# Patient Record
Sex: FEMALE | Race: BLACK OR AFRICAN AMERICAN | Employment: FULL TIME | URBAN - METROPOLITAN AREA
[De-identification: names, ages, dates, MRNs, and addresses within clinical notes are randomized per-mention and may not be internally consistent; named-entity substitution may affect disease eponyms.]

---

## 2017-02-10 ENCOUNTER — ANESTHESIA (OUTPATIENT)
Dept: OPERATING ROOM | Age: 62
End: 2017-02-10
Payer: COMMERCIAL

## 2017-02-10 ENCOUNTER — ANESTHESIA EVENT (OUTPATIENT)
Dept: OPERATING ROOM | Age: 62
End: 2017-02-10
Payer: COMMERCIAL

## 2017-02-10 ENCOUNTER — HOSPITAL ENCOUNTER (OUTPATIENT)
Age: 62
Setting detail: OUTPATIENT SURGERY
Discharge: HOME | End: 2017-02-10
Attending: OTOLARYNGOLOGY | Admitting: OTOLARYNGOLOGY
Payer: COMMERCIAL

## 2017-02-10 VITALS
TEMPERATURE: 96.8 F | SYSTOLIC BLOOD PRESSURE: 115 MMHG | BODY MASS INDEX: 30.36 KG/M2 | DIASTOLIC BLOOD PRESSURE: 86 MMHG | HEIGHT: 62 IN | WEIGHT: 165 LBS | OXYGEN SATURATION: 98 % | RESPIRATION RATE: 14 BRPM | HEART RATE: 97 BPM

## 2017-02-10 VITALS — SYSTOLIC BLOOD PRESSURE: 159 MMHG | TEMPERATURE: 94.9 F | DIASTOLIC BLOOD PRESSURE: 120 MMHG | OXYGEN SATURATION: 100 %

## 2017-02-10 LAB
GLUCOSE BLD-MCNC: 92 MG/DL (ref 74–106)
POC POTASSIUM: 3.4 MMOL/L (ref 3.5–5.1)

## 2017-02-10 PROCEDURE — 84132 ASSAY OF SERUM POTASSIUM: CPT

## 2017-02-10 PROCEDURE — 2580000003 HC RX 258: Performed by: ANESTHESIOLOGY

## 2017-02-10 PROCEDURE — 7100000000 HC PACU RECOVERY - FIRST 15 MIN: Performed by: OTOLARYNGOLOGY

## 2017-02-10 PROCEDURE — 2780000010 HC IMPLANT OTHER: Performed by: OTOLARYNGOLOGY

## 2017-02-10 PROCEDURE — 2500000003 HC RX 250 WO HCPCS: Performed by: OTOLARYNGOLOGY

## 2017-02-10 PROCEDURE — 3700000000 HC ANESTHESIA ATTENDED CARE: Performed by: OTOLARYNGOLOGY

## 2017-02-10 PROCEDURE — 7100000011 HC PHASE II RECOVERY - ADDTL 15 MIN: Performed by: OTOLARYNGOLOGY

## 2017-02-10 PROCEDURE — 88305 TISSUE EXAM BY PATHOLOGIST: CPT

## 2017-02-10 PROCEDURE — 7100000001 HC PACU RECOVERY - ADDTL 15 MIN: Performed by: OTOLARYNGOLOGY

## 2017-02-10 PROCEDURE — 3600000004 HC SURGERY LEVEL 4 BASE: Performed by: OTOLARYNGOLOGY

## 2017-02-10 PROCEDURE — 7100000010 HC PHASE II RECOVERY - FIRST 15 MIN: Performed by: OTOLARYNGOLOGY

## 2017-02-10 PROCEDURE — 2500000003 HC RX 250 WO HCPCS: Performed by: ANESTHESIOLOGY

## 2017-02-10 PROCEDURE — 6360000002 HC RX W HCPCS

## 2017-02-10 PROCEDURE — 3700000001 HC ADD 15 MINUTES (ANESTHESIA): Performed by: OTOLARYNGOLOGY

## 2017-02-10 PROCEDURE — 2580000003 HC RX 258: Performed by: OTOLARYNGOLOGY

## 2017-02-10 PROCEDURE — 3600000014 HC SURGERY LEVEL 4 ADDTL 15MIN: Performed by: OTOLARYNGOLOGY

## 2017-02-10 PROCEDURE — 6370000000 HC RX 637 (ALT 250 FOR IP): Performed by: OTOLARYNGOLOGY

## 2017-02-10 PROCEDURE — 6360000002 HC RX W HCPCS: Performed by: NURSE ANESTHETIST, CERTIFIED REGISTERED

## 2017-02-10 PROCEDURE — 2500000003 HC RX 250 WO HCPCS

## 2017-02-10 PROCEDURE — 2500000003 HC RX 250 WO HCPCS: Performed by: NURSE ANESTHETIST, CERTIFIED REGISTERED

## 2017-02-10 PROCEDURE — 6360000002 HC RX W HCPCS: Performed by: ANESTHESIOLOGY

## 2017-02-10 PROCEDURE — 6370000000 HC RX 637 (ALT 250 FOR IP): Performed by: ANESTHESIOLOGY

## 2017-02-10 PROCEDURE — 82947 ASSAY GLUCOSE BLOOD QUANT: CPT

## 2017-02-10 PROCEDURE — 6360000002 HC RX W HCPCS: Performed by: OTOLARYNGOLOGY

## 2017-02-10 DEVICE — VENT TUBE 1016040 5PK MOD GOODE T SIL
Type: IMPLANTABLE DEVICE | Status: FUNCTIONAL
Brand: GOODE T-TUBE®

## 2017-02-10 RX ORDER — OXYMETAZOLINE HYDROCHLORIDE 0.05 G/100ML
2 SPRAY NASAL EVERY 10 MIN PRN
Status: COMPLETED | OUTPATIENT
Start: 2017-02-10 | End: 2017-02-10

## 2017-02-10 RX ORDER — FENTANYL CITRATE 50 UG/ML
50 INJECTION, SOLUTION INTRAMUSCULAR; INTRAVENOUS EVERY 5 MIN PRN
Status: DISCONTINUED | OUTPATIENT
Start: 2017-02-10 | End: 2017-02-10 | Stop reason: HOSPADM

## 2017-02-10 RX ORDER — OXYCODONE HYDROCHLORIDE AND ACETAMINOPHEN 5; 325 MG/1; MG/1
1 TABLET ORAL ONCE
Status: DISCONTINUED | OUTPATIENT
Start: 2017-02-10 | End: 2017-02-10 | Stop reason: HOSPADM

## 2017-02-10 RX ORDER — DEXAMETHASONE SODIUM PHOSPHATE 10 MG/ML
INJECTION INTRAMUSCULAR; INTRAVENOUS PRN
Status: DISCONTINUED | OUTPATIENT
Start: 2017-02-10 | End: 2017-02-10 | Stop reason: SDUPTHER

## 2017-02-10 RX ORDER — OXYMETAZOLINE HYDROCHLORIDE 0.05 G/100ML
SPRAY NASAL PRN
Status: DISCONTINUED | OUTPATIENT
Start: 2017-02-10 | End: 2017-02-10 | Stop reason: HOSPADM

## 2017-02-10 RX ORDER — MIDAZOLAM HYDROCHLORIDE 1 MG/ML
INJECTION INTRAMUSCULAR; INTRAVENOUS PRN
Status: DISCONTINUED | OUTPATIENT
Start: 2017-02-10 | End: 2017-02-10 | Stop reason: SDUPTHER

## 2017-02-10 RX ORDER — HYDRALAZINE HYDROCHLORIDE 20 MG/ML
5 INJECTION INTRAMUSCULAR; INTRAVENOUS EVERY 10 MIN PRN
Status: DISCONTINUED | OUTPATIENT
Start: 2017-02-10 | End: 2017-02-10 | Stop reason: HOSPADM

## 2017-02-10 RX ORDER — ONDANSETRON 2 MG/ML
4 INJECTION INTRAMUSCULAR; INTRAVENOUS
Status: COMPLETED | OUTPATIENT
Start: 2017-02-10 | End: 2017-02-10

## 2017-02-10 RX ORDER — LIDOCAINE HYDROCHLORIDE 10 MG/ML
0.4 INJECTION, SOLUTION EPIDURAL; INFILTRATION; INTRACAUDAL; PERINEURAL ONCE
Status: COMPLETED | OUTPATIENT
Start: 2017-02-10 | End: 2017-02-10

## 2017-02-10 RX ORDER — OXYCODONE HYDROCHLORIDE AND ACETAMINOPHEN 5; 325 MG/1; MG/1
2 TABLET ORAL ONCE
Status: DISCONTINUED | OUTPATIENT
Start: 2017-02-10 | End: 2017-02-10 | Stop reason: HOSPADM

## 2017-02-10 RX ORDER — SCOLOPAMINE TRANSDERMAL SYSTEM 1 MG/1
1 PATCH, EXTENDED RELEASE TRANSDERMAL ONCE
Status: DISCONTINUED | OUTPATIENT
Start: 2017-02-10 | End: 2017-02-10 | Stop reason: HOSPADM

## 2017-02-10 RX ORDER — ONDANSETRON 2 MG/ML
INJECTION INTRAMUSCULAR; INTRAVENOUS PRN
Status: DISCONTINUED | OUTPATIENT
Start: 2017-02-10 | End: 2017-02-10 | Stop reason: SDUPTHER

## 2017-02-10 RX ORDER — TRIAMCINOLONE ACETONIDE 40 MG/ML
INJECTION, SUSPENSION INTRA-ARTICULAR; INTRAMUSCULAR PRN
Status: DISCONTINUED | OUTPATIENT
Start: 2017-02-10 | End: 2017-02-10 | Stop reason: HOSPADM

## 2017-02-10 RX ORDER — GLYCOPYRROLATE 0.2 MG/ML
INJECTION INTRAMUSCULAR; INTRAVENOUS PRN
Status: DISCONTINUED | OUTPATIENT
Start: 2017-02-10 | End: 2017-02-10 | Stop reason: SDUPTHER

## 2017-02-10 RX ORDER — FENTANYL CITRATE 50 UG/ML
25 INJECTION, SOLUTION INTRAMUSCULAR; INTRAVENOUS EVERY 5 MIN PRN
Status: DISCONTINUED | OUTPATIENT
Start: 2017-02-10 | End: 2017-02-10 | Stop reason: HOSPADM

## 2017-02-10 RX ORDER — LIDOCAINE HYDROCHLORIDE 10 MG/ML
INJECTION, SOLUTION EPIDURAL; INFILTRATION; INTRACAUDAL; PERINEURAL PRN
Status: DISCONTINUED | OUTPATIENT
Start: 2017-02-10 | End: 2017-02-10 | Stop reason: SDUPTHER

## 2017-02-10 RX ORDER — LIDOCAINE HYDROCHLORIDE AND EPINEPHRINE 10; 10 MG/ML; UG/ML
INJECTION, SOLUTION INFILTRATION; PERINEURAL PRN
Status: DISCONTINUED | OUTPATIENT
Start: 2017-02-10 | End: 2017-02-10 | Stop reason: HOSPADM

## 2017-02-10 RX ORDER — MEPERIDINE HYDROCHLORIDE 50 MG/ML
12.5 INJECTION INTRAMUSCULAR; INTRAVENOUS; SUBCUTANEOUS EVERY 5 MIN PRN
Status: DISCONTINUED | OUTPATIENT
Start: 2017-02-10 | End: 2017-02-10 | Stop reason: HOSPADM

## 2017-02-10 RX ORDER — PROPOFOL 10 MG/ML
INJECTION, EMULSION INTRAVENOUS PRN
Status: DISCONTINUED | OUTPATIENT
Start: 2017-02-10 | End: 2017-02-10 | Stop reason: SDUPTHER

## 2017-02-10 RX ORDER — PROMETHAZINE HYDROCHLORIDE 25 MG/ML
6.25 INJECTION, SOLUTION INTRAMUSCULAR; INTRAVENOUS
Status: COMPLETED | OUTPATIENT
Start: 2017-02-10 | End: 2017-02-10

## 2017-02-10 RX ORDER — DEXTROSE MONOHYDRATE 50 MG/ML
INJECTION, SOLUTION INTRAVENOUS CONTINUOUS
Status: DISCONTINUED | OUTPATIENT
Start: 2017-02-10 | End: 2017-02-10 | Stop reason: HOSPADM

## 2017-02-10 RX ORDER — SODIUM CHLORIDE, SODIUM LACTATE, POTASSIUM CHLORIDE, CALCIUM CHLORIDE 600; 310; 30; 20 MG/100ML; MG/100ML; MG/100ML; MG/100ML
1000 INJECTION, SOLUTION INTRAVENOUS CONTINUOUS
Status: DISCONTINUED | OUTPATIENT
Start: 2017-02-10 | End: 2017-02-10 | Stop reason: HOSPADM

## 2017-02-10 RX ORDER — ECHINACEA PURPUREA EXTRACT 125 MG
1 TABLET ORAL PRN
Status: DISCONTINUED | OUTPATIENT
Start: 2017-02-10 | End: 2017-02-10 | Stop reason: HOSPADM

## 2017-02-10 RX ORDER — NEOSTIGMINE METHYLSULFATE 1 MG/ML
INJECTION, SOLUTION INTRAVENOUS PRN
Status: DISCONTINUED | OUTPATIENT
Start: 2017-02-10 | End: 2017-02-10 | Stop reason: SDUPTHER

## 2017-02-10 RX ORDER — FENTANYL CITRATE 50 UG/ML
INJECTION, SOLUTION INTRAMUSCULAR; INTRAVENOUS PRN
Status: DISCONTINUED | OUTPATIENT
Start: 2017-02-10 | End: 2017-02-10 | Stop reason: SDUPTHER

## 2017-02-10 RX ORDER — DIPHENHYDRAMINE HYDROCHLORIDE 50 MG/ML
12.5 INJECTION INTRAMUSCULAR; INTRAVENOUS
Status: DISCONTINUED | OUTPATIENT
Start: 2017-02-10 | End: 2017-02-10 | Stop reason: HOSPADM

## 2017-02-10 RX ORDER — MIDAZOLAM HYDROCHLORIDE 1 MG/ML
1 INJECTION INTRAMUSCULAR; INTRAVENOUS EVERY 5 MIN PRN
Status: COMPLETED | OUTPATIENT
Start: 2017-02-10 | End: 2017-02-10

## 2017-02-10 RX ORDER — OXYCODONE HYDROCHLORIDE AND ACETAMINOPHEN 5; 325 MG/1; MG/1
1 TABLET ORAL EVERY 6 HOURS PRN
Qty: 30 TABLET | Refills: 0 | Status: SHIPPED | OUTPATIENT
Start: 2017-02-10 | End: 2017-02-17

## 2017-02-10 RX ORDER — ROCURONIUM BROMIDE 10 MG/ML
INJECTION, SOLUTION INTRAVENOUS PRN
Status: DISCONTINUED | OUTPATIENT
Start: 2017-02-10 | End: 2017-02-10 | Stop reason: SDUPTHER

## 2017-02-10 RX ORDER — LABETALOL HYDROCHLORIDE 5 MG/ML
5 INJECTION, SOLUTION INTRAVENOUS EVERY 10 MIN PRN
Status: DISCONTINUED | OUTPATIENT
Start: 2017-02-10 | End: 2017-02-10 | Stop reason: HOSPADM

## 2017-02-10 RX ORDER — AMOXICILLIN AND CLAVULANATE POTASSIUM 875; 125 MG/1; MG/1
1 TABLET, FILM COATED ORAL 2 TIMES DAILY
Qty: 20 TABLET | Refills: 0 | Status: SHIPPED | OUTPATIENT
Start: 2017-02-10 | End: 2017-02-20

## 2017-02-10 RX ORDER — MAGNESIUM HYDROXIDE 1200 MG/15ML
LIQUID ORAL CONTINUOUS PRN
Status: DISCONTINUED | OUTPATIENT
Start: 2017-02-10 | End: 2017-02-10 | Stop reason: HOSPADM

## 2017-02-10 RX ORDER — WATER 1000 ML/1000ML
INJECTION, SOLUTION INTRAVENOUS PRN
Status: DISCONTINUED | OUTPATIENT
Start: 2017-02-10 | End: 2017-02-10 | Stop reason: HOSPADM

## 2017-02-10 RX ADMIN — LIDOCAINE HYDROCHLORIDE 0.4 ML: 10 INJECTION, SOLUTION INFILTRATION; PERINEURAL at 08:10

## 2017-02-10 RX ADMIN — MIDAZOLAM HYDROCHLORIDE 1 MG: 1 INJECTION, SOLUTION INTRAMUSCULAR; INTRAVENOUS at 09:15

## 2017-02-10 RX ADMIN — PROPOFOL 200 MG: 10 INJECTION, EMULSION INTRAVENOUS at 09:16

## 2017-02-10 RX ADMIN — HYDROMORPHONE HYDROCHLORIDE 0.5 MG: 1 INJECTION, SOLUTION INTRAMUSCULAR; INTRAVENOUS; SUBCUTANEOUS at 14:40

## 2017-02-10 RX ADMIN — HYDRALAZINE HYDROCHLORIDE 5 MG: 20 INJECTION INTRAMUSCULAR; INTRAVENOUS at 12:13

## 2017-02-10 RX ADMIN — OXYMETAZOLINE HYDROCHLORIDE 2 SPRAY: 5 SPRAY NASAL at 08:22

## 2017-02-10 RX ADMIN — OXYMETAZOLINE HYDROCHLORIDE 2 SPRAY: 5 SPRAY NASAL at 08:12

## 2017-02-10 RX ADMIN — HYDRALAZINE HYDROCHLORIDE 5 MG: 20 INJECTION INTRAMUSCULAR; INTRAVENOUS at 12:00

## 2017-02-10 RX ADMIN — PHENYLEPHRINE HYDROCHLORIDE 100 MCG: 10 INJECTION INTRAMUSCULAR; INTRAVENOUS; SUBCUTANEOUS at 09:41

## 2017-02-10 RX ADMIN — FENTANYL CITRATE 100 MCG: 50 INJECTION, SOLUTION INTRAMUSCULAR; INTRAVENOUS at 09:16

## 2017-02-10 RX ADMIN — NEOSTIGMINE METHYLSULFATE 3 MG: 1 INJECTION INTRAVENOUS at 10:52

## 2017-02-10 RX ADMIN — SODIUM CHLORIDE, POTASSIUM CHLORIDE, SODIUM LACTATE AND CALCIUM CHLORIDE: 600; 310; 30; 20 INJECTION, SOLUTION INTRAVENOUS at 10:14

## 2017-02-10 RX ADMIN — FENTANYL CITRATE 50 MCG: 50 INJECTION, SOLUTION INTRAMUSCULAR; INTRAVENOUS at 10:39

## 2017-02-10 RX ADMIN — SODIUM CHLORIDE, POTASSIUM CHLORIDE, SODIUM LACTATE AND CALCIUM CHLORIDE 1000 ML: 600; 310; 30; 20 INJECTION, SOLUTION INTRAVENOUS at 08:10

## 2017-02-10 RX ADMIN — FENTANYL CITRATE 50 MCG: 50 INJECTION, SOLUTION INTRAMUSCULAR; INTRAVENOUS at 12:20

## 2017-02-10 RX ADMIN — SODIUM CHLORIDE, POTASSIUM CHLORIDE, SODIUM LACTATE AND CALCIUM CHLORIDE 1000 ML: 600; 310; 30; 20 INJECTION, SOLUTION INTRAVENOUS at 12:20

## 2017-02-10 RX ADMIN — DEXAMETHASONE SODIUM PHOSPHATE 10 MG: 10 INJECTION INTRAMUSCULAR; INTRAVENOUS at 09:25

## 2017-02-10 RX ADMIN — GLYCOPYRROLATE 0.4 MG: 0.2 INJECTION INTRAMUSCULAR; INTRAVENOUS at 10:52

## 2017-02-10 RX ADMIN — MIDAZOLAM HYDROCHLORIDE 1 MG: 1 INJECTION, SOLUTION INTRAMUSCULAR; INTRAVENOUS at 08:30

## 2017-02-10 RX ADMIN — ROCURONIUM BROMIDE 40 MG: 10 INJECTION, SOLUTION INTRAVENOUS at 09:16

## 2017-02-10 RX ADMIN — PROMETHAZINE HYDROCHLORIDE 6.25 MG: 25 INJECTION, SOLUTION INTRAMUSCULAR; INTRAVENOUS at 11:20

## 2017-02-10 RX ADMIN — ONDANSETRON 4 MG: 2 INJECTION, SOLUTION INTRAMUSCULAR; INTRAVENOUS at 10:11

## 2017-02-10 RX ADMIN — MIDAZOLAM HYDROCHLORIDE 1 MG: 1 INJECTION, SOLUTION INTRAMUSCULAR; INTRAVENOUS at 08:22

## 2017-02-10 RX ADMIN — FENTANYL CITRATE 50 MCG: 50 INJECTION, SOLUTION INTRAMUSCULAR; INTRAVENOUS at 11:20

## 2017-02-10 RX ADMIN — ONDANSETRON 4 MG: 2 INJECTION, SOLUTION INTRAMUSCULAR; INTRAVENOUS at 11:15

## 2017-02-10 RX ADMIN — PHENYLEPHRINE HYDROCHLORIDE 100 MCG: 10 INJECTION INTRAMUSCULAR; INTRAVENOUS; SUBCUTANEOUS at 09:56

## 2017-02-10 RX ADMIN — LIDOCAINE HYDROCHLORIDE 50 MG: 10 INJECTION, SOLUTION EPIDURAL; INFILTRATION; INTRACAUDAL; PERINEURAL at 09:16

## 2017-02-10 RX ADMIN — PHENYLEPHRINE HYDROCHLORIDE 100 MCG: 10 INJECTION INTRAMUSCULAR; INTRAVENOUS; SUBCUTANEOUS at 10:07

## 2017-02-10 RX ADMIN — HYDROMORPHONE HYDROCHLORIDE 0.5 MG: 1 INJECTION, SOLUTION INTRAMUSCULAR; INTRAVENOUS; SUBCUTANEOUS at 11:25

## 2017-02-10 RX ADMIN — HYDROMORPHONE HYDROCHLORIDE 0.5 MG: 1 INJECTION, SOLUTION INTRAMUSCULAR; INTRAVENOUS; SUBCUTANEOUS at 14:45

## 2017-02-10 RX ADMIN — MIDAZOLAM HYDROCHLORIDE 1 MG: 1 INJECTION, SOLUTION INTRAMUSCULAR; INTRAVENOUS at 09:16

## 2017-02-10 RX ADMIN — FENTANYL CITRATE 50 MCG: 50 INJECTION, SOLUTION INTRAMUSCULAR; INTRAVENOUS at 09:35

## 2017-02-10 RX ADMIN — HYDROMORPHONE HYDROCHLORIDE 0.5 MG: 1 INJECTION, SOLUTION INTRAMUSCULAR; INTRAVENOUS; SUBCUTANEOUS at 12:15

## 2017-02-10 ASSESSMENT — PAIN SCALES - GENERAL
PAINLEVEL_OUTOF10: 0
PAINLEVEL_OUTOF10: 8
PAINLEVEL_OUTOF10: 7
PAINLEVEL_OUTOF10: 8
PAINLEVEL_OUTOF10: 7

## 2017-02-10 ASSESSMENT — PAIN DESCRIPTION - LOCATION: LOCATION: NOSE

## 2017-02-10 ASSESSMENT — ENCOUNTER SYMPTOMS
STRIDOR: 0
SHORTNESS OF BREATH: 0

## 2017-02-10 ASSESSMENT — PAIN - FUNCTIONAL ASSESSMENT: PAIN_FUNCTIONAL_ASSESSMENT: 0-10

## 2017-02-10 ASSESSMENT — PAIN DESCRIPTION - PAIN TYPE: TYPE: SURGICAL PAIN

## 2017-02-14 LAB — SURGICAL PATHOLOGY REPORT: NORMAL

## 2017-10-28 DIAGNOSIS — M79.644 PAIN OF FINGER OF RIGHT HAND: Primary | ICD-10-CM

## 2017-10-30 ENCOUNTER — OFFICE VISIT (OUTPATIENT)
Dept: ORTHOPEDIC SURGERY | Age: 62
End: 2017-10-30
Payer: COMMERCIAL

## 2017-10-30 VITALS — HEIGHT: 61 IN | WEIGHT: 173 LBS | BODY MASS INDEX: 32.66 KG/M2

## 2017-10-30 DIAGNOSIS — M65.331 TRIGGER FINGER, RIGHT MIDDLE FINGER: Primary | ICD-10-CM

## 2017-10-30 PROCEDURE — 99203 OFFICE O/P NEW LOW 30 MIN: CPT | Performed by: STUDENT IN AN ORGANIZED HEALTH CARE EDUCATION/TRAINING PROGRAM

## 2017-10-30 NOTE — PROGRESS NOTES
[facility]  [facility]  [address] [place], [place] [address]y [address] [address]  Dept: [phone]    Ambulatory Orthopedic Consult      CHIEF COMPLAINT:    Chief Complaint   Patient presents with    Finger Pain     right middle        HISTORY OF PRESENT ILLNESS:      The patient is a 65 y.o. female who is being seen at the request of  Laurie Mcclain MD for consultation and evaluation of  A right middle trigger finger that has been present for approximately 2 years. The patient notes that this has become more progressive and is now triggering constantly. Initially she notes that this would trigger only occasionally but now it occurs on a daily basis. 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 The patient would like to have this issue corrected sooner rather than later so that she can return to work. Patient denies fevers, chills, nausea, vomiting, chest pain, shortness of breath, numbness or tingling of the affected finger. Patient notes that there are no other fingers that are currently triggering.       Past Medical History:    Past Medical History:   Diagnosis Date    HTN (hypertension)     IBS (irritable bowel syndrome)     Ovarian cyst     reabsorbed    Rotator cuff syndrome of right shoulder        Past Surgical History:    Past Surgical History:   Procedure Laterality Date     SECTION      CHOLECYSTECTOMY, LAPAROSCOPIC      COLONOSCOPY      negative    MYRINGOTOMY AND TYMPANOSTOMY TUBE PLACEMENT Bilateral 02/10/2017    OTHER SURGICAL HISTORY  02/10/2017    FESS    [place] NASAL SCOPE,BX/RMV POLYP/DEBRID N/A 2/10/2017    ENDOSCOPIC SINUS SURGERY WITH STEALTH NAVIGATION, MAXILLARY ANTROSTOMY, ETHMOIDECTOMY, FRONTAL SINUS EXPLORATION, SPHENOIDOTOMY, BILATERAL TURBINATE REDUCTION, RIGHT EAR TUBE INSERTION performed by Sanchez Lomeli MD at [address] [place] [address] right shoulder x 2    TONSILLECTOMY      WISDOM TOOTH EXTRACTION         Current Medications:   Current Outpatient Prescriptions   Medication Sig Dispense Refill    ibuprofen (ADVIL;MOTRIN) 800 MG tablet Take 800 mg by mouth every 6 hours as needed.  vitamin D3 (CHOLECALCIFEROL) 400 UNITS TABS tablet Take 400 Units by mouth daily      simvastatin (ZOCOR) 10 MG tablet Take 10 mg by mouth nightly 1/2 tablet daily      calcium carbonate (OSCAL) 500 MG TABS tablet Take 1 mg by mouth Twice a Week      Cetirizine-Pseudoephedrine (ZYRTEC-D PO) Take by mouth as needed       cyclobenzaprine (FLEXERIL) 10 MG tablet Take 10 mg by mouth as needed.  hydrochlorothiazide (HYDRODIURIL) 25 MG tablet Take 25 mg by mouth daily.  loratadine (CLARITIN) 10 MG tablet Take 10 mg by mouth as needed. No current facility-administered medications for this visit. Facility-Administered Medications Ordered in Other Visits   Medication Dose Route Frequency Provider Last Rate Last Dose    Bupivacaine HCl (PF) (MARCAINE) 0.75 % injection 225 mg  30 mL Intradermal Once Drucella Glow, DO           Allergies:    Review of patient's allergies indicates no known allergies. Social History:   Social History     Social History    Marital status:      Spouse name: N/A    Number of children: N/A    Years of education: N/A     Occupational History    Not on file.      Social History Main Topics    Smoking status: Never Smoker    Smokeless tobacco: Never Used    Alcohol use Yes      Comment: social    Drug use: No    Sexual activity: Yes     Other Topics Concern    Not on file     Social History Narrative    No narrative on file       Family History:  Family History   Problem Relation Age of Onset    High Blood Pressure Mother     COPD Mother     Cancer Maternal Cousin 46     Colon CA    COPD Maternal Cousin     Cancer Maternal Grandmother      ovarian     High Blood Pressure Maternal Grandmother REVIEW OF SYSTEMS:    Constitutional: Negative for fever and chills. HENT: Negative for congestion or drainage   Eyes: Negative for blurred vision and double vision. Respiratory: Negative for cough, shortness of breath and wheezing. Cardiovascular: Negative for chest pain and palpitations. Gastrointestinal: Negative for nausea. Negative for vomiting. Musculoskeletal: Positive for myalgias and joint pain. Skin: Negative for itching and rash. Neurological: Negative for dizziness, sensory change and headaches. Psychiatric/Behavioral: Negative for depression and suicidal ideas. PHYSICAL EXAM:  Ht 5' 1\" (1.549 m)   Wt 173 lb (78.5 kg)   BMI 32.69 kg/m²   Physical Exam  Gen: alert and oriented  Psych:  Appropriate affect; Appropriate knowledge base; Appropriate mood; No hallucinations; Head: normocephalic atraumatic   Cardiovascular: Regular rate, no dependent edema, distal pulses 2+  Respiratory: Chest symmetric, no accessory muscle use, normal respirations  Ortho Exam  RUE: Patient able to actively flex and extend all digits of the right hand. During examination should be noted that the right middle finger became stuck in flexion and had to be manually released by the patient. Tenderness over the A1 pulley was noted with a thickened nodule present. Distal neurovascular status is intact grossly. Radial pulse 2+ with brisk capillary refill, the hand is warm and well-perfused. Radiology: The radiographs taken today. ASSESSMENT:     1. Trigger finger, right middle finger         PLAN:  Given the patient's progressive triggering of the right middle finger, we discussed options with her including conservative treatment with steroid injections as well as operative fixation of the finger.  She would like to undergo operative fixation of the trigger finger in order to alleviate the pain and symptomatology, we will work as the holidays are approaching and the surgical area the hospital is short staffed. Surgical scheduling form was filled out and the patient that with the surgical scheduler to find the next mutually convenient time available for both parties for surgery. Return if symptoms worsen or fail to improve. No orders of the defined types were placed in this encounter. No orders of the defined types were placed in this encounter. Reviewed Subjective section with patient who did agree and confirmed everything documented. Discussed plan and patient expressed understanding of diagnosis and prognosis with plan as stated. All questions answered.      Thea Musa,    Orthopedic Surgery Resident PGY-1  [id number] \[facility]\""

## 2017-11-27 ENCOUNTER — ANESTHESIA EVENT (OUTPATIENT)
Dept: OPERATING ROOM | Age: 62
End: 2017-11-27
Payer: COMMERCIAL

## 2017-11-28 ENCOUNTER — ANESTHESIA (OUTPATIENT)
Dept: OPERATING ROOM | Age: 62
End: 2017-11-28
Payer: COMMERCIAL

## 2017-11-28 ENCOUNTER — HOSPITAL ENCOUNTER (OUTPATIENT)
Age: 62
Setting detail: OUTPATIENT SURGERY
Discharge: HOME | End: 2017-11-28
Attending: ORTHOPAEDIC SURGERY | Admitting: ORTHOPAEDIC SURGERY
Payer: COMMERCIAL

## 2017-11-28 VITALS
HEART RATE: 60 BPM | HEIGHT: 62 IN | TEMPERATURE: 96.8 F | WEIGHT: 160 LBS | BODY MASS INDEX: 29.44 KG/M2 | OXYGEN SATURATION: 98 % | DIASTOLIC BLOOD PRESSURE: 93 MMHG | RESPIRATION RATE: 16 BRPM | SYSTOLIC BLOOD PRESSURE: 114 MMHG

## 2017-11-28 VITALS — OXYGEN SATURATION: 99 % | SYSTOLIC BLOOD PRESSURE: 104 MMHG | DIASTOLIC BLOOD PRESSURE: 71 MMHG

## 2017-11-28 LAB
ALLEN TEST: ABNORMAL
ANION GAP: 8 MMOL/L (ref 7–16)
FIO2: ABNORMAL
GFR NON-AFRICAN AMERICAN: >60 ML/MIN
GFR SERPL CREATININE-BSD FRML MDRD: >60 ML/MIN
GFR SERPL CREATININE-BSD FRML MDRD: NORMAL ML/MIN/{1.73_M2}
GLUCOSE BLD-MCNC: 98 MG/DL (ref 74–100)
HCO3 VENOUS: 32.5 MMOL/L (ref 22–29)
MODE: ABNORMAL
NEGATIVE BASE EXCESS, VEN: ABNORMAL (ref 0–2)
O2 DEVICE/FLOW/%: ABNORMAL
O2 SAT, VEN: 71 % (ref 60–85)
PATIENT TEMP: ABNORMAL
PCO2, VEN: 50.4 MM HG (ref 41–51)
PH VENOUS: 7.42 (ref 7.32–7.43)
PO2, VEN: 37.5 MM HG (ref 30–50)
POC CHLORIDE: 102 MMOL/L (ref 98–107)
POC CREATININE: 0.9 MG/DL (ref 0.51–1.19)
POC HEMATOCRIT: 39 % (ref 36–46)
POC HEMOGLOBIN: 13.2 G/DL (ref 12–16)
POC IONIZED CALCIUM: 1.25 MMOL/L (ref 1.15–1.33)
POC LACTIC ACID: 0.9 MMOL/L (ref 0.56–1.39)
POC PCO2 TEMP: ABNORMAL MM HG
POC PH TEMP: ABNORMAL
POC PO2 TEMP: ABNORMAL MM HG
POC POTASSIUM: 3.3 MMOL/L (ref 3.5–4.5)
POC SODIUM: 142 MMOL/L (ref 138–146)
POSITIVE BASE EXCESS, VEN: 7 (ref 0–3)
SAMPLE SITE: ABNORMAL
TOTAL CO2, VENOUS: 34 MMOL/L (ref 23–30)

## 2017-11-28 PROCEDURE — 2580000003 HC RX 258: Performed by: ANESTHESIOLOGY

## 2017-11-28 PROCEDURE — 2500000003 HC RX 250 WO HCPCS: Performed by: ANESTHESIOLOGY

## 2017-11-28 PROCEDURE — 84132 ASSAY OF SERUM POTASSIUM: CPT

## 2017-11-28 PROCEDURE — 82565 ASSAY OF CREATININE: CPT

## 2017-11-28 PROCEDURE — 2580000003 HC RX 258: Performed by: ORTHOPAEDIC SURGERY

## 2017-11-28 PROCEDURE — 6360000002 HC RX W HCPCS: Performed by: STUDENT IN AN ORGANIZED HEALTH CARE EDUCATION/TRAINING PROGRAM

## 2017-11-28 PROCEDURE — 84295 ASSAY OF SERUM SODIUM: CPT

## 2017-11-28 PROCEDURE — 2500000003 HC RX 250 WO HCPCS: Performed by: SPECIALIST

## 2017-11-28 PROCEDURE — 3700000000 HC ANESTHESIA ATTENDED CARE: Performed by: ORTHOPAEDIC SURGERY

## 2017-11-28 PROCEDURE — 85014 HEMATOCRIT: CPT

## 2017-11-28 PROCEDURE — 82330 ASSAY OF CALCIUM: CPT

## 2017-11-28 PROCEDURE — 82435 ASSAY OF BLOOD CHLORIDE: CPT

## 2017-11-28 PROCEDURE — 2580000003 HC RX 258: Performed by: SPECIALIST

## 2017-11-28 PROCEDURE — 3700000001 HC ADD 15 MINUTES (ANESTHESIA): Performed by: ORTHOPAEDIC SURGERY

## 2017-11-28 PROCEDURE — 6370000000 HC RX 637 (ALT 250 FOR IP): Performed by: ANESTHESIOLOGY

## 2017-11-28 PROCEDURE — 6360000002 HC RX W HCPCS: Performed by: SPECIALIST

## 2017-11-28 PROCEDURE — 82947 ASSAY GLUCOSE BLOOD QUANT: CPT

## 2017-11-28 PROCEDURE — 7100000040 HC SPAR PHASE II RECOVERY - FIRST 15 MIN: Performed by: ORTHOPAEDIC SURGERY

## 2017-11-28 PROCEDURE — 83605 ASSAY OF LACTIC ACID: CPT

## 2017-11-28 PROCEDURE — 2500000003 HC RX 250 WO HCPCS: Performed by: ORTHOPAEDIC SURGERY

## 2017-11-28 PROCEDURE — 26055 INCISE FINGER TENDON SHEATH: CPT | Performed by: ORTHOPAEDIC SURGERY

## 2017-11-28 PROCEDURE — 6360000002 HC RX W HCPCS

## 2017-11-28 PROCEDURE — 6360000002 HC RX W HCPCS: Performed by: ANESTHESIOLOGY

## 2017-11-28 PROCEDURE — 3600000012 HC SURGERY LEVEL 2 ADDTL 15MIN: Performed by: ORTHOPAEDIC SURGERY

## 2017-11-28 PROCEDURE — 82803 BLOOD GASES ANY COMBINATION: CPT

## 2017-11-28 PROCEDURE — 3600000002 HC SURGERY LEVEL 2 BASE: Performed by: ORTHOPAEDIC SURGERY

## 2017-11-28 PROCEDURE — 7100000041 HC SPAR PHASE II RECOVERY - ADDTL 15 MIN: Performed by: ORTHOPAEDIC SURGERY

## 2017-11-28 RX ORDER — MIDAZOLAM HYDROCHLORIDE 1 MG/ML
INJECTION INTRAMUSCULAR; INTRAVENOUS
Status: COMPLETED
Start: 2017-11-28 | End: 2017-11-28

## 2017-11-28 RX ORDER — HYDROCODONE BITARTRATE AND ACETAMINOPHEN 5; 325 MG/1; MG/1
1 TABLET ORAL PRN
Status: COMPLETED | OUTPATIENT
Start: 2017-11-28 | End: 2017-11-28

## 2017-11-28 RX ORDER — HYDROCODONE BITARTRATE AND ACETAMINOPHEN 5; 325 MG/1; MG/1
2 TABLET ORAL PRN
Status: COMPLETED | OUTPATIENT
Start: 2017-11-28 | End: 2017-11-28

## 2017-11-28 RX ORDER — SODIUM CHLORIDE, SODIUM LACTATE, POTASSIUM CHLORIDE, CALCIUM CHLORIDE 600; 310; 30; 20 MG/100ML; MG/100ML; MG/100ML; MG/100ML
INJECTION, SOLUTION INTRAVENOUS CONTINUOUS
Status: DISCONTINUED | OUTPATIENT
Start: 2017-11-28 | End: 2017-11-28 | Stop reason: HOSPADM

## 2017-11-28 RX ORDER — LIDOCAINE HYDROCHLORIDE 10 MG/ML
INJECTION, SOLUTION INFILTRATION; PERINEURAL PRN
Status: DISCONTINUED | OUTPATIENT
Start: 2017-11-28 | End: 2017-11-28 | Stop reason: SDUPTHER

## 2017-11-28 RX ORDER — FENTANYL CITRATE 50 UG/ML
25 INJECTION, SOLUTION INTRAMUSCULAR; INTRAVENOUS EVERY 5 MIN PRN
Status: COMPLETED | OUTPATIENT
Start: 2017-11-28 | End: 2017-11-28

## 2017-11-28 RX ORDER — FENTANYL CITRATE 50 UG/ML
INJECTION, SOLUTION INTRAMUSCULAR; INTRAVENOUS PRN
Status: DISCONTINUED | OUTPATIENT
Start: 2017-11-28 | End: 2017-11-28 | Stop reason: SDUPTHER

## 2017-11-28 RX ORDER — BUPIVACAINE HYDROCHLORIDE 5 MG/ML
INJECTION, SOLUTION EPIDURAL; INTRACAUDAL PRN
Status: DISCONTINUED | OUTPATIENT
Start: 2017-11-28 | End: 2017-11-28 | Stop reason: HOSPADM

## 2017-11-28 RX ORDER — PROPOFOL 10 MG/ML
INJECTION, EMULSION INTRAVENOUS CONTINUOUS PRN
Status: DISCONTINUED | OUTPATIENT
Start: 2017-11-28 | End: 2017-11-28 | Stop reason: SDUPTHER

## 2017-11-28 RX ORDER — LIDOCAINE HYDROCHLORIDE 10 MG/ML
INJECTION, SOLUTION EPIDURAL; INFILTRATION; INTRACAUDAL; PERINEURAL PRN
Status: DISCONTINUED | OUTPATIENT
Start: 2017-11-28 | End: 2017-11-28 | Stop reason: HOSPADM

## 2017-11-28 RX ORDER — KETAMINE HYDROCHLORIDE 100 MG/ML
INJECTION, SOLUTION INTRAMUSCULAR; INTRAVENOUS PRN
Status: DISCONTINUED | OUTPATIENT
Start: 2017-11-28 | End: 2017-11-28 | Stop reason: SDUPTHER

## 2017-11-28 RX ORDER — MIDAZOLAM HYDROCHLORIDE 1 MG/ML
INJECTION INTRAMUSCULAR; INTRAVENOUS PRN
Status: DISCONTINUED | OUTPATIENT
Start: 2017-11-28 | End: 2017-11-28 | Stop reason: SDUPTHER

## 2017-11-28 RX ORDER — HYDROCODONE BITARTRATE AND ACETAMINOPHEN 5; 325 MG/1; MG/1
1-2 TABLET ORAL EVERY 6 HOURS PRN
Qty: 20 TABLET | Refills: 0 | Status: SHIPPED | OUTPATIENT
Start: 2017-11-28 | End: 2017-12-05

## 2017-11-28 RX ORDER — SODIUM CHLORIDE, SODIUM LACTATE, POTASSIUM CHLORIDE, CALCIUM CHLORIDE 600; 310; 30; 20 MG/100ML; MG/100ML; MG/100ML; MG/100ML
INJECTION, SOLUTION INTRAVENOUS CONTINUOUS PRN
Status: DISCONTINUED | OUTPATIENT
Start: 2017-11-28 | End: 2017-11-28 | Stop reason: SDUPTHER

## 2017-11-28 RX ORDER — ONDANSETRON 2 MG/ML
INJECTION INTRAMUSCULAR; INTRAVENOUS PRN
Status: DISCONTINUED | OUTPATIENT
Start: 2017-11-28 | End: 2017-11-28 | Stop reason: SDUPTHER

## 2017-11-28 RX ORDER — LIDOCAINE HYDROCHLORIDE 10 MG/ML
0.4 INJECTION, SOLUTION INFILTRATION; PERINEURAL ONCE
Status: COMPLETED | OUTPATIENT
Start: 2017-11-28 | End: 2017-11-28

## 2017-11-28 RX ORDER — MIDAZOLAM HYDROCHLORIDE 1 MG/ML
2 INJECTION INTRAMUSCULAR; INTRAVENOUS
Status: COMPLETED | OUTPATIENT
Start: 2017-11-28 | End: 2017-11-28

## 2017-11-28 RX ORDER — MAGNESIUM HYDROXIDE 1200 MG/15ML
LIQUID ORAL CONTINUOUS PRN
Status: DISCONTINUED | OUTPATIENT
Start: 2017-11-28 | End: 2017-11-28 | Stop reason: HOSPADM

## 2017-11-28 RX ADMIN — FENTANYL CITRATE 25 MCG: 50 INJECTION INTRAMUSCULAR; INTRAVENOUS at 08:05

## 2017-11-28 RX ADMIN — FENTANYL CITRATE 50 MCG: 50 INJECTION INTRAMUSCULAR; INTRAVENOUS at 07:05

## 2017-11-28 RX ADMIN — MIDAZOLAM HYDROCHLORIDE 2 MG: 1 INJECTION INTRAMUSCULAR; INTRAVENOUS at 06:58

## 2017-11-28 RX ADMIN — ONDANSETRON 4 MG: 2 INJECTION, SOLUTION INTRAMUSCULAR; INTRAVENOUS at 07:35

## 2017-11-28 RX ADMIN — HYDROCODONE BITARTRATE AND ACETAMINOPHEN 2 TABLET: 5; 325 TABLET ORAL at 08:20

## 2017-11-28 RX ADMIN — SODIUM CHLORIDE, POTASSIUM CHLORIDE, SODIUM LACTATE AND CALCIUM CHLORIDE: 600; 310; 30; 20 INJECTION, SOLUTION INTRAVENOUS at 06:23

## 2017-11-28 RX ADMIN — LIDOCAINE HYDROCHLORIDE 0.4 ML: 10 INJECTION, SOLUTION INFILTRATION; PERINEURAL at 06:24

## 2017-11-28 RX ADMIN — Medication 2 G: at 07:14

## 2017-11-28 RX ADMIN — MIDAZOLAM HYDROCHLORIDE 2 MG: 1 INJECTION, SOLUTION INTRAMUSCULAR; INTRAVENOUS at 06:58

## 2017-11-28 RX ADMIN — FENTANYL CITRATE 25 MCG: 50 INJECTION INTRAMUSCULAR; INTRAVENOUS at 08:10

## 2017-11-28 RX ADMIN — SODIUM CHLORIDE, POTASSIUM CHLORIDE, SODIUM LACTATE AND CALCIUM CHLORIDE: 600; 310; 30; 20 INJECTION, SOLUTION INTRAVENOUS at 06:45

## 2017-11-28 RX ADMIN — FENTANYL CITRATE 50 MCG: 50 INJECTION INTRAMUSCULAR; INTRAVENOUS at 07:10

## 2017-11-28 RX ADMIN — MIDAZOLAM HYDROCHLORIDE 2 MG: 1 INJECTION, SOLUTION INTRAMUSCULAR; INTRAVENOUS at 07:05

## 2017-11-28 RX ADMIN — LIDOCAINE HYDROCHLORIDE 30 MG: 10 INJECTION, SOLUTION INFILTRATION; PERINEURAL at 07:10

## 2017-11-28 RX ADMIN — FENTANYL CITRATE 25 MCG: 50 INJECTION INTRAMUSCULAR; INTRAVENOUS at 08:20

## 2017-11-28 RX ADMIN — FENTANYL CITRATE 25 MCG: 50 INJECTION INTRAMUSCULAR; INTRAVENOUS at 08:15

## 2017-11-28 RX ADMIN — KETAMINE HYDROCHLORIDE 10 MG: 100 INJECTION, SOLUTION, CONCENTRATE INTRAMUSCULAR; INTRAVENOUS at 07:10

## 2017-11-28 RX ADMIN — PROPOFOL 50 MCG/KG/MIN: 10 INJECTION, EMULSION INTRAVENOUS at 07:10

## 2017-11-28 ASSESSMENT — PULMONARY FUNCTION TESTS
PIF_VALUE: 1
PIF_VALUE: 0
PIF_VALUE: 1
PIF_VALUE: 0
PIF_VALUE: 1

## 2017-11-28 ASSESSMENT — PAIN DESCRIPTION - PAIN TYPE
TYPE: SURGICAL PAIN
TYPE: SURGICAL PAIN

## 2017-11-28 ASSESSMENT — PAIN SCALES - GENERAL
PAINLEVEL_OUTOF10: 0
PAINLEVEL_OUTOF10: 7
PAINLEVEL_OUTOF10: 5
PAINLEVEL_OUTOF10: 7
PAINLEVEL_OUTOF10: 4
PAINLEVEL_OUTOF10: 7

## 2017-11-28 ASSESSMENT — PAIN - FUNCTIONAL ASSESSMENT: PAIN_FUNCTIONAL_ASSESSMENT: 0-10

## 2017-11-28 NOTE — ANESTHESIA PRE PROCEDURE
Department of Anesthesiology  Preprocedure Note       Name:  Mayito Eddy   Age:  65 y.o.  :  1955                                          MRN:  [id number]         Date:  2017      Surgeon: Wen Medina):  Roddy Sanders DO    Procedure: Procedure(s):  RIGHT MIDDLE FINGER TRIGGER RELEASE (3080 TABLE)    Medications prior to admission:   Prior to Admission medications    Medication Sig Start Date End Date Taking? Authorizing Provider   simvastatin (ZOCOR) 10 MG tablet Take 10 mg by mouth nightly 1/2 tablet daily   Yes Historical Provider, MD   cyclobenzaprine (FLEXERIL) 10 MG tablet Take 10 mg by mouth as needed. Yes Historical Provider, MD   hydrochlorothiazide (HYDRODIURIL) 25 MG tablet Take 25 mg by mouth daily. Yes Historical Provider, MD   ibuprofen (ADVIL;MOTRIN) 800 MG tablet Take 800 mg by mouth every 6 hours as needed LAST DOSE 2017   Yes Historical Provider, MD   loratadine (CLARITIN) 10 MG tablet Take 10 mg by mouth as needed.      Yes Historical Provider, MD   calcium carbonate (OSCAL) 500 MG TABS tablet Take 1 mg by mouth Twice a Week    Historical Provider, MD   Cetirizine-Pseudoephedrine (ZYRTEC-D PO) Take by mouth as needed     Historical Provider, MD       Current medications:    Current Facility-Administered Medications   Medication Dose Route Frequency Provider Last Rate Last Dose    lactated ringers infusion   Intravenous Continuous Fidelia Toussaint  mL/hr at 17 7103      ceFAZolin (ANCEF) 2 g in sterile water 20 mL IV syringe  2 g Intravenous On Call to Carl 71, DO         Facility-Administered Medications Ordered in Other Encounters   Medication Dose Route Frequency Provider Last Rate Last Dose    Bupivacaine HCl (PF) (MARCAINE) 0.75 % injection 225 mg  30 mL Intradermal Once Heike Marking, DO           Allergies:  No Known Allergies    Problem List:    Patient Active Problem List   Diagnosis Code    HTN (hypertension) I10    Ovarian cyst N83.209    Rotator cuff syndrome of right shoulder M75. 101    IBS (irritable bowel syndrome) K58.9       Past Medical History:        Diagnosis Date    HTN (hypertension) 2010    ON RX    Hyperlipidemia 2014    ON RX     IBS (irritable bowel syndrome)     Ovarian cyst     reabsorbed    Rotator cuff syndrome of right shoulder     ON GOING    Wears glasses        Past Surgical History:        Procedure Laterality Date     SECTION  1987    CHOLECYSTECTOMY, LAPAROSCOPIC      COLONOSCOPY      negative    MYRINGOTOMY AND TYMPANOSTOMY TUBE PLACEMENT Bilateral 02/10/2017    OTHER SURGICAL HISTORY  02/10/2017    FESS    [place] NASAL SCOPE,BX/RMV POLYP/DEBRID N/A 2/10/2017    ENDOSCOPIC SINUS SURGERY WITH STEALTH NAVIGATION, MAXILLARY ANTROSTOMY, ETHMOIDECTOMY, FRONTAL SINUS EXPLORATION, SPHENOIDOTOMY, BILATERAL TURBINATE REDUCTION, RIGHT EAR TUBE INSERTION performed by Idalia Fagan MD at [address] [place] [address]      right shoulder x 2    TONSILLECTOMY  1966    WISDOM TOOTH EXTRACTION      4 TEETH REMOVED       Social History:    Social History   Substance Use Topics    Smoking status: Never Smoker    Smokeless tobacco: Never Used    Alcohol use Yes      Comment: BEER WINE OR MIXED DRINKS 1 OR 2 A WEEK                                Counseling given: Not Answered      Vital Signs (Current):   Vitals:    17 0923 17 0558 17 0606   BP:   (!) 133/95   Pulse:   79   Resp:   16   Temp:   97.7 °F (36.5 °C)   TempSrc:   Temporal   SpO2:   96%   Weight: 160 lb (72.6 kg) 160 lb (72.6 kg)    Height: 5' 1.75\" (1.568 m) 5' 1.75\" (1.568 m)                                               BP Readings from Last 3 Encounters:   17 (!) 133/95   02/10/17 115/86   02/10/17 (!) 159/120       NPO Status: Time of last liquid consumption:                         Time of last solid consumption:                         Date of last liquid consumption: 17 Date of last solid food consumption: 11/27/17    BMI:   Wt Readings from Last 3 Encounters:   11/28/17 160 lb (72.6 kg)   10/30/17 173 lb (78.5 kg)   02/10/17 165 lb (74.8 kg)     Body mass index is 29.5 kg/m². CBC:   Lab Results   Component Value Date    WBC 6.5 10/17/2014    RBC 4.53 10/17/2014    HGB 12.4 10/17/2014    HCT 38.0 10/17/2014    MCV 84.1 10/17/2014    RDW 13.9 10/17/2014     10/17/2014       CMP:   Lab Results   Component Value Date     01/27/2017    K 4.0 01/27/2017    CL 99 01/27/2017    CO2 28 01/27/2017    BUN 15 01/27/2017    CREATININE 0.90 11/28/2017    CREATININE 0.80 01/27/2017    GFRAA >60 01/27/2017    LABGLOM >60 11/28/2017    GLUCOSE 85 01/27/2017    GLUCOSE 73 10/14/2011    PROT 7.2 10/17/2014    CALCIUM 9.1 10/17/2014    BILITOT 0.48 10/17/2014    ALKPHOS 84 10/17/2014    AST 18 10/17/2014    ALT 17 10/17/2014       POC Tests:   Recent Labs      11/28/17   0618   POCGLU  98   POCNA  142   POCK  3.3*   POCCL  102   POCHEMO  13.2   POCHCT  39       Coags: No results found for: PROTIME, INR, APTT    HCG (If Applicable): No results found for: PREGTESTUR, PREGSERUM, HCG, HCGQUANT     ABGs: No results found for: PHART, PO2ART, LEB4VQH, BOR8MSV, BEART, I8QQULYX     Type & Screen (If Applicable):  No results found for: LABABO, [address]    Anesthesia Evaluation  Patient summary reviewed and Nursing notes reviewed no history of anesthetic complications:   Airway: Mallampati: II  TM distance: >3 FB   Neck ROM: full  Mouth opening: > = 3 FB Dental: normal exam         Pulmonary:Negative Pulmonary ROS and normal exam                               Cardiovascular:  Exercise tolerance: good (>4 METS),   (+) hypertension:,     (-) past MI, CAD, CABG/stent, dysrhythmias,  angina and  CHF       Beta Blocker:  Not on Beta Blocker         Neuro/Psych:               GI/Hepatic/Renal:        (-) GERD, liver disease and bowel prep      ROS comment: IBS.    Endo/Other: Negative Endo/Other ROS Abdominal:           Vascular:                                        Anesthesia Plan      general, MAC and regional     ASA 2       Induction: intravenous. Anesthetic plan and risks discussed with patient.       Plan discussed with CRNA and surgical team.                  Caesar Soares MD   11/28/2017

## 2017-11-30 NOTE — OP NOTE
[address] [facility]                                 OPERATIVE REPORT    PATIENT NAME: Kyle Del Cid                 :        1955  MED REC NO:   [id number]                             ROOM:  ACCOUNT NO:   [de-identified]                           ADMIT DATE: 2017  PROVIDER:     Nereida Byrd    DATE OF PROCEDURE:  2017    SURGEON:  Leyla Vegas DO    ASSISTANTS:  Yashira June DO, PGY-5; Nereida Byrd DO, PGY-3    PREOPERATIVE DIAGNOSIS:  Right middle finger trigger finger. POSTOPERATIVE DIAGNOSIS:  Right middle finger trigger finger. PROCEDURE:  Right middle finger A1 pulley release. ANESTHESIA:  MAC with local.    BLOOD LOSS:  Zero. IV FLUIDS:  800 mL. TOURNIQUET TIME:  13 minutes. COMPLICATIONS:  None. 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 She has had a 2-year  history of right middle finger triggering and pain. She states that it has  been getting worse lately. The pain is not being controlled with  over-the-counter pain medications. She states that she would like to get  this fixed because it is affecting her activities of daily living. The  details of surgical release of the A1 pulley were discussed in detail. The  risks of the procedure were discussed including, but not limited to,  infection, wound complications, residual pain, recurrence, numbness, need  for further surgery, damage to nearby structures, and anesthesia risks. Knowing these risks, the patient wished to proceed with the procedure  described. OPERATIVE NOTE:  The patient was met in the preoperative area, where  consent was obtained, and the correct operative extremity was clearly  marked. The patient was transferred to the operative suite and placed  supine on the operating room table with a hand table in place.

## 2017-12-01 ENCOUNTER — TELEPHONE (OUTPATIENT)
Dept: ORTHOPEDIC SURGERY | Age: 62
End: 2017-12-01

## 2017-12-04 ENCOUNTER — HOSPITAL ENCOUNTER (OUTPATIENT)
Age: 62
Setting detail: SPECIMEN
Discharge: HOME | End: 2017-12-04
Payer: COMMERCIAL

## 2017-12-04 ENCOUNTER — OFFICE VISIT (OUTPATIENT)
Dept: OBGYN CLINIC | Age: 62
End: 2017-12-04
Payer: COMMERCIAL

## 2017-12-04 VITALS
WEIGHT: 175 LBS | DIASTOLIC BLOOD PRESSURE: 72 MMHG | SYSTOLIC BLOOD PRESSURE: 116 MMHG | BODY MASS INDEX: 33.04 KG/M2 | HEIGHT: 61 IN

## 2017-12-04 DIAGNOSIS — Z12.31 ENCOUNTER FOR SCREENING MAMMOGRAM FOR MALIGNANT NEOPLASM OF BREAST: ICD-10-CM

## 2017-12-04 DIAGNOSIS — Z01.419 ENCOUNTER FOR ROUTINE GYNECOLOGICAL EXAMINATION WITH PAPANICOLAOU SMEAR OF CERVIX: Primary | ICD-10-CM

## 2017-12-04 DIAGNOSIS — Z12.11 ENCOUNTER FOR SCREENING FOR MALIGNANT NEOPLASM OF COLON: ICD-10-CM

## 2017-12-04 PROCEDURE — 99396 PREV VISIT EST AGE 40-64: CPT | Performed by: OBSTETRICS & GYNECOLOGY

## 2017-12-04 ASSESSMENT — ENCOUNTER SYMPTOMS
VOMITING: 0
ABDOMINAL PAIN: 0
DIARRHEA: 0
DOUBLE VISION: 0
NAUSEA: 0
CONSTIPATION: 0
HEARTBURN: 0
BLURRED VISION: 0
COUGH: 0
ORTHOPNEA: 0
WHEEZING: 0

## 2017-12-04 NOTE — PROGRESS NOTES
[facility]  [facility] [address] [address]  Dept: [phone]    DATE OF VISIT:  17        History and Physical    Felix Joshi    :  1955  CHIEF COMPLAINT:    Chief Complaint   Patient presents with    Annual Exam     Last pap 16 Neg, last karon 16 Neg, Last DEXA 16 Normal                    HPI :   Felix Joshi is a 65 y.o. female here for her annual exam. She reports cervical stenosis last year for her pap smear. Occasional vaginal dryness ( has erectile issues from radiation treatment for prostate cancer).  No hot flushes, no bleeding.     _______________________________________________________  Past Medical History:   Diagnosis Date    HTN (hypertension)     ON RX    Hyperlipidemia     ON RX     IBS (irritable bowel syndrome)     Ovarian cyst     reabsorbed    Rotator cuff syndrome of right shoulder     ON GOING    Wears glasses                                                                    Past Surgical History:   Procedure Laterality Date     SECTION      CHOLECYSTECTOMY, LAPAROSCOPIC      COLONOSCOPY      negative    FINGER TRIGGER RELEASE Right 2017    middle    MYRINGOTOMY AND TYMPANOSTOMY TUBE PLACEMENT Bilateral 02/10/2017    OTHER SURGICAL HISTORY  02/10/2017    FESS    OR INCISE FINGER TENDON SHEATH Right 2017    RIGHT MIDDLE FINGER TRIGGER RELEASE (3080 TABLE) performed by Darron Fish DO at [address] [facility] [address] SCOPE,BX/RMV POLYP/DEBRID N/A 2/10/2017    ENDOSCOPIC SINUS SURGERY WITH STEALTH NAVIGATION, MAXILLARY ANTROSTOMY, ETHMOIDECTOMY, FRONTAL SINUS EXPLORATION, SPHENOIDOTOMY, BILATERAL TURBINATE REDUCTION, RIGHT EAR TUBE INSERTION performed by Maury Mai MD at [address] [place] [address]      right shoulder x 2    TONSILLECTOMY  1966    WISDOM TOOTH EXTRACTION      4 TEETH REMOVED     Family History   Problem Relation Age of Onset    High Blood Pressure Mother     COPD Mother     Cancer Maternal Cousin 46     Colon CA    Cancer Maternal Grandmother      ovarian     High Blood Pressure Maternal Grandmother     Other Brother      CEREBAL PALSY   OF NATURAL CAUSES     History   Smoking Status    Never Smoker   Smokeless Tobacco    Never Used     History   Alcohol Use    Yes     Comment: BEER WINE OR MIXED DRINKS 1 OR 2 A WEEK     Current Outpatient Prescriptions   Medication Sig Dispense Refill    HYDROcodone-acetaminophen (NORCO) 5-325 MG per tablet Take 1-2 tablets by mouth every 6 hours as needed for Pain . Earliest Fill Date: 17 20 tablet 0    simvastatin (ZOCOR) 10 MG tablet Take 10 mg by mouth nightly 1/2 tablet daily      calcium carbonate (OSCAL) 500 MG TABS tablet Take 1 mg by mouth Twice a Week      Cetirizine-Pseudoephedrine (ZYRTEC-D PO) Take by mouth as needed       cyclobenzaprine (FLEXERIL) 10 MG tablet Take 10 mg by mouth as needed.  hydrochlorothiazide (HYDRODIURIL) 25 MG tablet Take 25 mg by mouth daily.  ibuprofen (ADVIL;MOTRIN) 800 MG tablet Take 800 mg by mouth every 6 hours as needed LAST DOSE 2017      loratadine (CLARITIN) 10 MG tablet Take 10 mg by mouth as needed. No current facility-administered medications for this visit. Facility-Administered Medications Ordered in Other Visits   Medication Dose Route Frequency Provider Last Rate Last Dose    Bupivacaine HCl (PF) (MARCAINE) 0.75 % injection 225 mg  30 mL Intradermal Once Julieth Tito, DO           Allergies:  Review of patient's allergies indicates no known allergies. Gynecologic History:  Patient's last menstrual period was 2008 (within days).   Sexually Active: Yes  STD History: No      Obstetric History       T0      L3     SAB0   TAB0   Ectopic0   Molar0   Multiple0   Live Births3      ______________________________________________________________________  REVIEW OF SYSTEMS:        Review of Systems   Constitutional: Negative for chills, fever and weight loss. HENT: Negative for hearing loss. Eyes: Negative for blurred vision and double vision. Respiratory: Negative for cough and wheezing. Cardiovascular: Negative for chest pain, palpitations and orthopnea. Gastrointestinal: Negative for abdominal pain, constipation, diarrhea, heartburn, nausea and vomiting. Genitourinary: Negative for dysuria, frequency and urgency. Musculoskeletal: Negative for joint pain and myalgias. Skin: Negative for rash. Neurological: Negative for dizziness, tingling, focal weakness, weakness and headaches. Endo/Heme/Allergies: Does not bruise/bleed easily. Psychiatric/Behavioral: Negative for depression, substance abuse and suicidal ideas. The patient does not have insomnia. /72   Ht 5' 1\" (1.549 m)   Wt 175 lb (79.4 kg)   LMP 11/27/2008 (Within Days)   BMI 33.07 kg/m²                                                                                                                                              Physical Exam:     Physical Exam   Constitutional: She is oriented to person, place, and time. She appears well-developed and well-nourished. HENT:   Head: Normocephalic. Neck: No JVD present. No thyromegaly present. Cardiovascular: Normal rate and regular rhythm. Pulmonary/Chest: Effort normal and breath sounds normal. No respiratory distress. She has no wheezes. She has no rales. She exhibits no tenderness. Right breast exhibits no inverted nipple, no mass, no nipple discharge, no skin change and no tenderness. Left breast exhibits no inverted nipple, no mass, no nipple discharge, no skin change and no tenderness. Breasts are symmetrical. There is no breast swelling. Abdominal: Soft. Bowel sounds are normal. She exhibits no distension. There is no tenderness.    Genitourinary: Vagina normal and uterus normal. No breast tenderness, discharge or bleeding. Pelvic exam was performed with patient supine. No labial fusion. There is no rash, tenderness, lesion or injury on the right labia. There is no rash, tenderness, lesion or injury on the left labia. Uterus is not deviated, not enlarged, not fixed and not tender. Cervix exhibits no motion tenderness, no discharge and no friability. Right adnexum displays no mass, no tenderness and no fullness. Left adnexum displays no mass, no tenderness and no fullness. No erythema or bleeding in the vagina. No foreign body in the vagina. Genitourinary Comments: Small introitus, no problem obtaining pap but used pediatric speculum, no evidence of agglutination   Musculoskeletal: Normal range of motion. Lymphadenopathy:     She has no cervical adenopathy. Neurological: She is alert and oriented to person, place, and time. She has normal reflexes. Skin: Skin is warm and dry. Psychiatric: She has a normal mood and affect. Her behavior is normal. Judgment and thought content normal.           ASSESSMENT:        65 y.o. Female; Annual  1. Encounter for routine gynecological examination with Papanicolaou smear of cervix  PAP SMEAR   2. Encounter for screening mammogram for malignant neoplasm of breast  ALISA DIGITAL SCREEN W CAD BILATERAL   3. Encounter for screening for malignant neoplasm of colon  POCT Fecal Immunochemical Test (FIT)     Return in about 1 year (around 12/4/2018) for annual exam.              Hereditary Breast, Ovarian, Colon and Uterine Cancer screening Done. Tobacco & Secondary smoke risks reviewed; instructed on cessation and avoidance    PLAN:  - Pap collected and sent for co-testing per ASCCP guidelines. Discussed discontinuing pap smear screening at age 73.   -Menopause symptoms discussed. Recommended OTC vaginal water based lubrication if needed; estrogen cream as a second step.    - Screening mammogram discussed and advised yearly if normal starting at age 36.  - Calcium and Vitamin D

## 2017-12-05 LAB
HPV SAMPLE: NORMAL
HPV SOURCE: NORMAL
HPV, GENOTYPE 16: NOT DETECTED
HPV, GENOTYPE 18: NOT DETECTED
HPV, HIGH RISK OTHER: NOT DETECTED
HPV, INTERPRETATION: NORMAL

## 2017-12-08 ENCOUNTER — HOSPITAL ENCOUNTER (OUTPATIENT)
Dept: MAMMOGRAPHY | Age: 62
Discharge: HOME | End: 2017-12-08
Payer: COMMERCIAL

## 2017-12-08 DIAGNOSIS — Z12.31 ENCOUNTER FOR SCREENING MAMMOGRAM FOR MALIGNANT NEOPLASM OF BREAST: ICD-10-CM

## 2017-12-08 LAB — CYTOLOGY REPORT: NORMAL

## 2017-12-08 PROCEDURE — 77063 BREAST TOMOSYNTHESIS BI: CPT

## 2017-12-13 ENCOUNTER — OFFICE VISIT (OUTPATIENT)
Dept: ORTHOPEDIC SURGERY | Age: 62
End: 2017-12-13

## 2017-12-13 VITALS — BODY MASS INDEX: 32.21 KG/M2 | HEIGHT: 62 IN | WEIGHT: 175.04 LBS

## 2017-12-13 DIAGNOSIS — M65.331 TRIGGER FINGER, RIGHT MIDDLE FINGER: Primary | ICD-10-CM

## 2017-12-13 DIAGNOSIS — M79.644 PAIN OF FINGER OF RIGHT HAND: ICD-10-CM

## 2017-12-13 PROCEDURE — 99024 POSTOP FOLLOW-UP VISIT: CPT | Performed by: ORTHOPAEDIC SURGERY

## 2017-12-13 NOTE — PROGRESS NOTES
S: Pt is a 65 y.o. female being seen for first postop follow-up status post right middle finger trigger release, date of surgery 11/28/17. The patient's pain has improved however thinks her finger is now stuck and will not fully extend. She has been doing flexion exercises and remove dressing on postop day 2 to complete these exercises. She denies any issues with the incision. She isolates the pain around the PIP joint. Denies any numbness, tingling, fever or chills. O: Body mass index is 32.28 kg/m². Gen: A&O, NAD, cooperative    Right hand: Long finger is warm and well perfused. The incision is healing appropriately. There are no signs of infection. The stitches are intact. There is no erythema or warmth around the incision. The incision is appropriately tender. There is tenderness circumferentially around the PIP joint. The PIP joint will not extend past 45°. PIP joint will flex 110°. There is some pain isolated to the PIP joint with passive flexion and extension within this range of motion. There is no triggering within the tolerable ROM. She can actively make a full fist.  FDS and FDP intact in sliding individually. Radiology  None    A/P: 65 y.o. female 2 weeks status post right middle finger A1 pulley release  - Pain medications as previously prescribed, can transition into OTC medications  - Range of motion exercises demonstrated and encouraged  - Scar massage  - Sutures removed  - Digital block performed to aid in diagnosis, see procedure below  - PIP extension splint applied, do not remove  - Return to clinic in 1 week for range of motion assessment and assessment at surgical site    Analia Chavez DO   3:06 PM 12/13/2017      Injection procedure:  Verbal consent obtained. Site marked and prepped with alcohol swabs. The area just proximal to the surgical incision was injected 5ml 1% plain lidocaine.   She continued to have some sensation in the finger, so the femur was further injected within the tendon sheath through the proximal finger flexion crease. The patient tolerated the procedure well. After the block, we were able to passively extend the PIP joint just to neutral.  She cannot actively extend to neutral.  We let her extend to her maximum distance can passively extend at the wrist with a healing at the level of the A1 pulley. There is no palpable catching or locking noted. Since we could not feel any passive catching or locking, we believe that she may have developed a bit of a PIP joint contracture. We will splint her PIP joint in extension for 1 week and have her follow-up. If there is no catching or locking at this time we will begin range of motion exercises.

## 2017-12-20 ENCOUNTER — OFFICE VISIT (OUTPATIENT)
Dept: ORTHOPEDIC SURGERY | Age: 62
End: 2017-12-20

## 2017-12-20 VITALS
DIASTOLIC BLOOD PRESSURE: 99 MMHG | SYSTOLIC BLOOD PRESSURE: 135 MMHG | BODY MASS INDEX: 28.88 KG/M2 | WEIGHT: 163 LBS | HEIGHT: 63 IN

## 2017-12-20 DIAGNOSIS — M65.331 TRIGGER FINGER, RIGHT MIDDLE FINGER: Primary | ICD-10-CM

## 2017-12-20 PROCEDURE — 99024 POSTOP FOLLOW-UP VISIT: CPT | Performed by: ORTHOPAEDIC SURGERY

## 2017-12-21 NOTE — PROGRESS NOTES
I performed a history and physical examination of the patient and discussed management with the resident. I reviewed the residents note and agree with the documented findings and plan of care. Any areas of disagreement are noted on the chart. I have personally evaluated this patient and have completed at least one if not all key elements of the E/M (history, physical exam, and MDM). Additional findings are as noted. I agree with the chief complaint, past medical history, past surgical history, allergies, medications, social and family history as documented unless otherwise noted below.      Electronically signed by Barb Courtney DO on 12/21/2017 at 10:54 AM

## 2017-12-27 ENCOUNTER — HOSPITAL ENCOUNTER (OUTPATIENT)
Dept: OCCUPATIONAL THERAPY | Age: 62
Setting detail: THERAPIES SERIES
Discharge: HOME | End: 2017-12-27
Payer: COMMERCIAL

## 2017-12-27 PROCEDURE — 97165 OT EVAL LOW COMPLEX 30 MIN: CPT

## 2017-12-27 PROCEDURE — 97110 THERAPEUTIC EXERCISES: CPT

## 2017-12-27 NOTE — CONSULTS
[x] [address] [facility] floor       [address] [place], [place]         Phone: [phone]       Fax: [phone] [] [address] at [place] [address] [facility] , [address]  Phone: [phone]  Fax: [phone]       Occupational Therapy Hand & Upper Extremity  Initial Evaluation      Date: 2017      Patient: Zac Randolph  : 1955  MRN: [id number]    Physician: Uri Franklin   Insurance: [facility]  Medical Diagnosis: Trigger finger, M65.331   Rehab Codes: FMS loss R29.818, stiffness m25.64, parasthesia R20.2, weakness M62.81  Onset Date: 17    Next Dr. Fontenot Ban: 18    Past Medical History: [  ] Melanie Shires  [  ] MI/Heart Problems  [  ] Refer to full medical chart in EPIC  [  ] Diabetes  [  ] Cancer  [x  ] HTN  [x  ] Arthritis  [  ] Pacemaker  [  ] Other:  Medications:  [x  ] Refer to full medical chart in Psychiatric  [  ] None  [  ] Other:  Allergies:  [  ] Refer to full medical chart in EPIC  [ x ] None  [  ] Other:        Mechanism of Injury: Overuse  Surgery Date: 17    Precautions:   []None [] Fall Risk []WB Status [] Pacemaker []Other: no lifting over 10 pounds            Involved Extremity:      [] Left[x]  Right  Dominant: [] Left [x]Right  Previous Level of Function:  I with all activities  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: [] Normal [x] Restricted [x] Off D/T Injury/Condition [] Retired [] Unemployed [] Disabled []Other:  Orthosis:     [] Currently has [] To be custom fabricated this date []Planned for subsequent visit    Type:      Subjective:  Chief Complaint: stiffness  Pain: Intensity:  7/10 Location: surgical site     Pain Type: [] Constant [x] Intermittent   [  ] with pain meds at rest   [] With movement/Resistive activity [] With Sedentary activity    Pain Altered Tx: []Yes []No  Action Taken:    Objective:  Modality Flow Sheet:  START STOP Tx Modality     Electrical Stim: 12-27-17  Ultrasound: __.8_ W/cm2 x _8__ mins  Duty factor: _x_100%  __50%  __33% __20%  Head size: 2.  MHz: __1mHz  x__3mHz  Location: scar on palmar side of R hand     Hot Pack:     Cold Pack:   Tests/Measurements:  Current Functional Level:  60% functionally impaired as measured with the DASH Functional Survey. 0-100 scale, with 0 = no Deficits     DIGITS    Right     Extension/Flexion   INDEX MIDDLE RING LITTLE   MCP +5/75 0/75 +10/89 +25/75   PIP -4/100 -40/90 -10/100 -4/95   DIP 0/25 0/50 0/40 0/40            STRENGTH      RIGHT LEFT    5 35   Lateral pinch 10 15   2 point pinch 5 10   3 jaw pinch 5 11     Problems:     [x] Pain       [x] ROM      [x] Strength  [x] Function     [x] Adherent Scar    [] Edema     [] Open Wound    [] Coordination    [x] Sensation     [] Falls: History/Risk of   []          Short Term Goals: (  3 Treatments)  1. Decrease Pain: 2/10 pain with simple adl movment  2. Increase A/P ROM (degrees): all R MCPs at 90 degrees of flexion for full fist  3. Increase strength (pounds): increase R  strength by 20 pounds for increased function  4. Increase function:  DASH score will be 45% functionally impaired or less  5. Scar(s) will be soft and pliable with minimal tethering  6. Independent with Home Exercise Program in 3 sessions      Long Term Goals: (  6 Treatments)  1. Increase R  strength to 35 for increased I with functional ADLS   2. DASH score to 10 or less for increased function. Patient Goals: to straighten my finger      Treatment Potential: [x]Good []Fair []Poor  Suggested Professional Referral: [x]Yes []No  Domestic Concerns: [x]Yes []No   Barriers to Goal Achievement: [x]Yes [] No    Comments/Assessment: Pt presents s/p trigger finger release with noted extensor lag of R Long finger. Pt reports trying to straighten it at home and working on scar massage. Pt reports numbness in palmar side of R hand at incision site.  Pt tolerated AROM/PROM and measurements this date. Pt issued attendance policy, AROM handouts and towel crunches handouts for HEP this date with good demo. Home Program Initiated:   [x  ] Written    [x  ] Verbal    [x  ] Demo   Comprehension of Education: [x] Yes [] No [] Needs Review  [x]Plans /[] Goals, []Risks/ [] Benefits discussed with [] Patient []Family    Treatment Plan:  Frequency/Duration:       Times a week, for      Weeks. [x] Therapeutic Exercise 87721 [] Electrical Stim [id number]    []Neuro Re-Ed  81934  [x] Written Home Program    [] Iontophoresis 57829  [x] Therapeutic Activities 54755  [x] Manual Therapy 70045  [x] Manual Therapy 06081  [x] Ultrasound 53559   [x] Hot Pack/Cold Pack 33682  [] Attended Electrical Stim A079010 [] Massage 96823    [] Ortho Fit/Train [id number]  [] Ortho Re-Fit/Check  [id number]          Treatment This Date:  [x] Pearl Guzmán / Evan Ferraritle. 10 [x] Therapeutic Exercise         Min. 36     [] ADL                          Min.  [x] US            Min. 8        Flow Sheet:  Exercise Reps/Time Weight/Level Comments   AROM   Initiated this date   PROM   Initiated this date   US   Initiated this date                       Total Treatment Time: 48    Time In: 4886    Time Out: 7314      Electronically signed by LILA Babin on 12/27/2017 at 10:29 AM        Physician Signature: _________________________ Date: _______________  By signing above or cosigning this note, I have reviewed this plan of care and certify a need for medically necessary rehabilitation services.      *PLEASE SIGN ABOVE AND FAX BACK ALL PAGES*

## 2017-12-29 ENCOUNTER — HOSPITAL ENCOUNTER (OUTPATIENT)
Dept: OCCUPATIONAL THERAPY | Age: 62
Setting detail: THERAPIES SERIES
Discharge: HOME | End: 2017-12-29
Payer: COMMERCIAL

## 2017-12-29 PROCEDURE — 97140 MANUAL THERAPY 1/> REGIONS: CPT

## 2017-12-29 PROCEDURE — 97110 THERAPEUTIC EXERCISES: CPT

## 2017-12-29 NOTE — FLOWSHEET NOTE
[x] [facility]       Occupational Therapy             1st floor       [address] [place], [place]         Phone: [phone]       Fax: [phone] [] [address] [facility] at            [address] [facility] , [address] [facility]     Phone: [phone]     Fax: [phone]      Occupational Therapy Daily Treatment Note    Date:  2017  Patient Name:  Ozzie Sorenson    :  1955  MRN: [id number]  Physician: [address] [facility] [address]: [address] [place] [address]    Medical Diagnosis: Trigger finger, M65.331              Rehab Codes: FMS loss R29.818, stiffness M25.64, parasthesia R20.2, weakness M62.81    Onset Date: 17                          Next Dr. Traci Krishnas: 18  Visit# / Total Visits: 2/6  3 times a week for 6 visits    Cancels/No Shows: 0/0    Subjective:    Pain:  [x] Yes  [] No Location: Right middle finger Pain Rating: (0-10 scale) 7/10 pre- tx, 5/10 post-tx  Pain altered Tx:  [] No  [] Yes  Action: NA  Pt Comments: \"Pain with motion, none at rest\". Objective:  Modalities:  Modality Flow Sheet:  START STOP Tx Modality       Electrical Stim:   17   Ultrasound: __.8_ W/cm2 x _8__ mins  Duty factor: _x_100%  __50%  __33% __20%  Head size: 2.  MHz: __1mHz  x__3mHz  Location: scar on palmar side of R hand       Hot Pack:             Flow Sheet:  Exercise Reps/Time Weight/Level Comments   Scar massage   Added. Pt educ provided: Written, verbal, demo. AROM - right hand 10 reps    Completed. HEP reviewed. PROM     Administered. Ultrasound, see parameters above     Administered.    Towel crunches  10 reps   Completed. HEP reviewed.    Intrinsic stretch - hold 10 sec  10 reps    Completed. HEP reviewed.                   Comments/Assessment: Pt reports pain with scar massage at home. Pt education for scar massage provided with improved comfort reported. Ultrasound continued to promote increased soft tissue extensibility.  AROM: pt is lacking final flexion of IP's, mild extensor lag of IPs, primarily due to finger edema. Pain 7/10 pre- tx, 5/10 post-tx following A/PROM, scar massage, ex as outlined above. Specific Instructions for Next Treatment:    Treatment Charges: Mins Units   [x]  Modalities:  Ultrasound   8 0   [x]  Ther Exercise 33 2   [x]  Manual Therapy   8 1   []  Ther Activities     []  Orthotic fitting/training     []  Orthotic re-check     []  Other         Assessment: [x] Progressing toward goals. [] No change. [] Other:    Short Term Goals: (  3 Treatments)  1. Decrease Pain: 2/10 pain with simple adl movment  2. Increase A/P ROM (degrees): all R MCPs at 90 degrees of flexion for full fist  3. Increase strength (pounds): increase R  strength by 20 pounds for increased function  4. Increase function:  DASH score will be 45% functionally impaired or less  5. Scar(s) will be soft and pliable with minimal tethering  6. Independent with Home Exercise Program in 3 sessions        Long Term Goals: (  6 Treatments)  1. Increase R  strength to 35 for increased I with functional ADLS             2.   DASH score to 10 or less for increased function.      Patient Goals: To straighten my finger       Pt. Education:  [x] Yes  [] No  [] Reviewed Prior HEP/Ed  Method of Education: [] Verbal  [] Demo  [x] Written  Re: scar massage  Comprehension of Education:  [x] Verbalizes understanding. [x] Demonstrates understanding. [] Needs review. [] Demonstrates/verbalizes HEP/Ed previously given. Treatment Plan:  3 times a week for 6 visits to promote /grasping of the right hand. Time In/Out: 1411 - 1500  Total Treatment Time:  52  Min.       Electronically signed by:  ANA Mazariegos/CORINNE, CHT

## 2018-01-03 ENCOUNTER — HOSPITAL ENCOUNTER (OUTPATIENT)
Dept: OCCUPATIONAL THERAPY | Age: 63
Setting detail: THERAPIES SERIES
Discharge: HOME | End: 2018-01-03
Payer: COMMERCIAL

## 2018-01-03 ENCOUNTER — OFFICE VISIT (OUTPATIENT)
Dept: ORTHOPEDIC SURGERY | Age: 63
End: 2018-01-03

## 2018-01-03 VITALS — BODY MASS INDEX: 30.76 KG/M2 | HEIGHT: 61 IN | WEIGHT: 162.92 LBS

## 2018-01-03 DIAGNOSIS — M65.331 TRIGGER MIDDLE FINGER OF RIGHT HAND: ICD-10-CM

## 2018-01-03 DIAGNOSIS — M62.40 EXTENSOR TENDON CONTRACTURE: Primary | ICD-10-CM

## 2018-01-03 PROCEDURE — 97140 MANUAL THERAPY 1/> REGIONS: CPT

## 2018-01-03 PROCEDURE — 97110 THERAPEUTIC EXERCISES: CPT

## 2018-01-03 PROCEDURE — 99024 POSTOP FOLLOW-UP VISIT: CPT | Performed by: STUDENT IN AN ORGANIZED HEALTH CARE EDUCATION/TRAINING PROGRAM

## 2018-01-03 PROCEDURE — 97035 APP MDLTY 1+ULTRASOUND EA 15: CPT

## 2018-01-03 NOTE — LETTER
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 If you have any questions or concerns, please don't hesitate to call.     Sincerely,        Danni Arciniega DO

## 2018-01-05 ENCOUNTER — HOSPITAL ENCOUNTER (OUTPATIENT)
Dept: OCCUPATIONAL THERAPY | Age: 63
Setting detail: THERAPIES SERIES
Discharge: HOME | End: 2018-01-05
Payer: COMMERCIAL

## 2018-01-05 PROCEDURE — 97140 MANUAL THERAPY 1/> REGIONS: CPT

## 2018-01-05 PROCEDURE — 97035 APP MDLTY 1+ULTRASOUND EA 15: CPT

## 2018-01-05 PROCEDURE — 97110 THERAPEUTIC EXERCISES: CPT

## 2018-01-05 NOTE — PROGRESS NOTES
reviewed their documentation prior to providing my signature indicating agreement. Objective :   Ht 5' 0.98\" (1.549 m)   Wt 162 lb 14.7 oz (73.9 kg)   LMP 11/27/2008 (Within Days)   BMI 30.80 kg/m²   General: AAOx3, sitting comfortably in exam room  Right Hand Exam     Tenderness   The patient is experiencing no tenderness (mild hyperesthesias to incision from recent middle finger TFR). Range of Motion     Hand   MP Middle: normal   PIP Middle: 90   DIP Middle: normal     Muscle Strength   : 5/5     Other   Erythema: absent  Scars: present (well-healed incision overlying MCP region of middle digit from recent surgery)  Sensation: normal (Sensation full and intact both radially and ulnarly to operative digit/middle finger)  Pulse: present (2+ radial and ulnar)    Comments:  Mild extensor lag of middle finger at PIP joint, approximately 3-5 deg. Pulm: respirations unlabored and regular, no audible wheezing. Skin: warm, well perfused  Psych: patient has good fund of knowledge and displays understanging of exam, diagnosis, and plan. Radiology:  None obtained in clinic today. Assessment:      1. Extensor tendon contracture    2. Trigger middle finger of right hand       Plan:      -Will arrange for dynamic PIP extension splint for right MF to be made for patient per OT. Recommend continue OT for additional 2-4 weeks as patient returns to work.  -No restrictions at this time. Incision is well-healed. Continue to work on scar massage to decrease incisional sensitivity.   -Work-release letter given today.   -Tylenol and/or Motrin or other NSAID of choice for any pain at this point in post-op period.  -Recommend follow-up in 6 weeks for re-evaluation. If patient is doing well, she may call and cancel appointment. Follow up:Return in about 6 weeks (around 2/14/2018). No orders of the defined types were placed in this encounter.     Orders Placed This Encounter   Procedures   [facility]     Referral Priority:   Routine     Referral Type:   Eval and Treat     Referral Reason:   Specialty Services Required     Requested Specialty:   Occupational Therapy     Number of Visits Requested:   1      Electronically signed by Irene Chacon DO on 1/4/2018 at 9:01 PM

## 2018-01-08 ENCOUNTER — HOSPITAL ENCOUNTER (OUTPATIENT)
Dept: OCCUPATIONAL THERAPY | Age: 63
Setting detail: THERAPIES SERIES
Discharge: HOME | End: 2018-01-08
Payer: COMMERCIAL

## 2018-01-08 PROCEDURE — 97140 MANUAL THERAPY 1/> REGIONS: CPT

## 2018-01-08 PROCEDURE — 97035 APP MDLTY 1+ULTRASOUND EA 15: CPT

## 2018-01-08 PROCEDURE — 97110 THERAPEUTIC EXERCISES: CPT

## 2018-01-08 NOTE — FLOWSHEET NOTE
[x] [facility]       Occupational Therapy             1st floor       [address] [place], [place]         Phone: [phone]       Fax: [phone] [] [address] [facility] at            [address] [facility] , [address] [place] [address]     Phone: [phone]     Fax: [phone]      Occupational Therapy Daily Treatment Note    Date:  2018  Patient Name:  Cruz Robertson    :  1955  MRN: [id number]  Physician: [address] [place] [address] Avenue: [facility]    Medical Diagnosis: Trigger finger, M65.331              Rehab Codes: FMS loss R29.818, stiffness M25.64, parasthesia R20.2, weakness M62.81    Onset Date: 17                          Next Dr. Godfrey Salazar: 18  Visit# / Total Visits: 5/6  3 times a week for 6 visits    Cancels/No Shows: 0/0    Subjective:    Pain:  [x] Yes  [] No Location: Right middle finger Pain Rating: (0-10 scale) 7/10 pre- tx, 5/10 post-tx, 7/10 post tx with AROM of middle finger  Pain altered Tx:  [x] No  [] Yes  Action: NA  Pt Comments: \"Pain with motion, decreased at rest\". Objective:  Modalities:  Modality Flow Sheet:  START STOP Tx Modality       Electrical Stim:   17   Ultrasound: __.8_ W/cm2 x _8__ mins  Duty factor: _x_100%  __50%  __33% __20%  Head size: 2.  MHz: __1mHz  x__3mHz  Location: scar on palmar side of R hand       Hot Pack:             Flow Sheet:  Exercise Reps/Time Weight/Level Comments   Scar massage   Administered. AROM - right hand 10 reps    Completed. PROM     Administered. Ultrasound, see parameters above     Administered.    Towel crunches  10 reps   Completed.    Intrinsic stretch - hold 10 sec   10 reps    Completed. Place and hold finger extension on table top      Completed.         Comments/Assessment:   Pt called and requested to move appointment from 11 AM to 3 PM this date. Pt reports increased pain following previous treatment and decreased in ROM in middle finger.  Pt reports completing ROM and scar massage at home. Ultrasound continued to promote increased soft tissue extensibility. AROM: pt is lacking final flexion of IP's, mild extensor lag of IP of middle finger, primarily due to finger edema. Pain 7/10 pre- tx, 5/10 post-tx following PROM, scar massage, ex as outlined above (7/10 pain with AROM). Pt reports 10+/10 pain with PROM, rest breaks as needed to complete. Per MD order: pt needs dynamic PIP extension splint for middle finger      Specific Instructions for Next Treatment:    Treatment Charges: Mins Units   [x]  Modalities:  Ultrasound     8     1   [x]  Ther Exercise    30      2   [x]  Manual Therapy    20     1   []  Ther Activities     []  Orthotic fitting/training     []  Orthotic re-check     []  Other         Assessment: [x] Progressing toward goals. [] No change. [] Other:    Short Term Goals: (  3 Treatments)  1. Decrease Pain: 2/10 pain with simple adl movment  2. Increase A/P ROM (degrees): all R MCPs at 90 degrees of flexion for full fist  3. Increase strength (pounds): increase R  strength by 20 pounds for increased function  4. Increase function:  DASH score will be 45% functionally impaired or less  5. Scar(s) will be soft and pliable with minimal tethering  6. Independent with Home Exercise Program in 3 sessions        Long Term Goals: (  6 Treatments)  1. Increase R  strength to 35 for increased I with functional ADLS             2.   DASH score to 10 or less for increased function.      Patient Goals: To straighten my finger       Pt. Education:  [x] Yes  [] No  [] Reviewed Prior HEP/Ed  Method of Education: [] Verbal  [] Demo  [x] Written  Re: scar massage  Comprehension of Education:  [x] Verbalizes understanding. [x] Demonstrates understanding. [] Needs review. [] Demonstrates/verbalizes HEP/Ed previously given. Treatment Plan:  3 times a week for 6 visits to promote /grasping of the right hand.        Time In/Out: 1500 - 1400  Total

## 2018-01-12 ENCOUNTER — HOSPITAL ENCOUNTER (OUTPATIENT)
Dept: OCCUPATIONAL THERAPY | Age: 63
Setting detail: THERAPIES SERIES
Discharge: HOME | End: 2018-01-12
Payer: COMMERCIAL

## 2018-01-12 PROCEDURE — 97760 ORTHOTIC MGMT&TRAING 1ST ENC: CPT

## 2018-01-12 PROCEDURE — 97530 THERAPEUTIC ACTIVITIES: CPT

## 2018-01-12 NOTE — PROGRESS NOTES
[x] [address] [facility]       [address] [place], [place]         Phone: [phone]       Fax: [phone] [] [facility] at [address] , [address]  Phone: [phone]  Fax: [phone]       Occupational Therapy Hand & Upper Extremity  Progress Note      Date: 2018      Patient: Traci Kim  : 1955  MRN: [id number]    Physician: Han Bryant   Insurance: [facility]    Medical Diagnosis: Trigger finger, M65.331   Rehab Codes: FMS loss R29.818, stiffness m25.64, parasthesia R20.2, weakness M62.81    Onset Date: 17    Next Dr. Radha Hester: f/u in four weeks  #Visits/Total Visits:   #Cancel/No Shows: 0/0     Past Medical History: [  ] Unremarkable  [  ] MI/Heart Problems  [  ] Refer to full medical chart in EPIC  [  ] Diabetes  [  ] Cancer  [x  ] HTN  [x  ] Arthritis  [  ] Pacemaker  [  ] Other:  Medications:  [x  ] Refer to full medical chart in [facility]  [  ] None  [  ] Other:  Allergies:  [  ] Refer to full medical chart in EPIC  [ x ] None  [  ] Other:     Mechanism of Injury: Overuse  Surgery Date: 17    Precautions:   []None [] Fall Risk []WB Status [] Pacemaker [x]Other: no lifting over 10 pounds            Involved Extremity:      [] Left[x]  Right  Dominant: [] Left [x]Right  Previous Level of Function:  I with all activities  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       Subjective:   Chief Complaint: stiffness  Pain: Intensity:  5-6/10 Location: Right middle finger, proximal into distal palm    Pain Type: [] Constant [x] Intermittent   [  ] with pain meds at rest   [x] With degrees of flexion for full fist -Improved, Unmet  3. Increase strength (pounds): increase R  strength by 20 pounds for increased function -Improved, Unmet  4. Increase function:  DASH score will be 45% functionally impaired or less -Unmet. 5. Scar(s) will be soft and pliable with minimal tethering -MET  6. Independent with Home Exercise Program in 3 sessions -MET      Long Term Goals: (  6 Treatments)  1. Increase R  strength to 35 for increased I with functional ADLS -Unmet   2. DASH score to 10 or less for increased function -Unmet    Patient Goals: to straighten my finger -Improved, Unmet. Comments/Assessment: Pt is s/p trigger finger release right middle finger, with persistent extensor lag of PIP joint of that finger. Extensor lag addressed with a LMB dynamic spring finger extension orthosis, which was fit and issued this date. Pt is independent with don/doff of the orthosis. Following discussion with pt, device is to be worn at night time and during the evenings at first, allowing pt to actively use the hand during the day especially at work without the device. Depending on how pt responds to the initial wearing time, may increase or decrease wearing time. Pt agreeable to the plan. Surgical scar is now soft, pliable, with minimal tethering to deeper structures as evidenced by palpation. Ultrasound has been beneficial with scar molding and soft tissue extensibility. AROM: pt is lacking final flexion of DIP joint of the middle finger. The finger pad is 0.3cm from touching the palm with active composite fisting. The mild extensor lag of the PIP joint is 20 degrees improved from the initial eval. The extensor lag is due to residual finger edema and finger weakness. Pain 5-6/10  today  Recommendations:  Pt is making slow but steady gains in AROM and strength. Recommending 6 additional occupational therapy visits to continue progress.   By co-signing this note you concur with request for

## 2018-12-21 ENCOUNTER — HOSPITAL ENCOUNTER (OUTPATIENT)
Dept: MAMMOGRAPHY | Facility: CLINIC | Age: 63
Discharge: HOME | End: 2018-12-23
Payer: COMMERCIAL

## 2018-12-21 DIAGNOSIS — Z12.39 SCREENING BREAST EXAMINATION: ICD-10-CM

## 2018-12-21 PROCEDURE — 77067 SCR MAMMO BI INCL CAD: CPT

## 2018-12-28 ENCOUNTER — ANESTHESIA (OUTPATIENT)
Dept: OPERATING ROOM | Age: 63
End: 2018-12-28
Payer: COMMERCIAL

## 2018-12-28 ENCOUNTER — ANESTHESIA EVENT (OUTPATIENT)
Dept: OPERATING ROOM | Age: 63
End: 2018-12-28
Payer: COMMERCIAL

## 2018-12-28 ENCOUNTER — HOSPITAL ENCOUNTER (OUTPATIENT)
Age: 63
Setting detail: OUTPATIENT SURGERY
Discharge: HOME | End: 2018-12-28
Attending: OTOLARYNGOLOGY | Admitting: OTOLARYNGOLOGY
Payer: COMMERCIAL

## 2018-12-28 VITALS
HEART RATE: 71 BPM | OXYGEN SATURATION: 97 % | TEMPERATURE: 97 F | RESPIRATION RATE: 20 BRPM | DIASTOLIC BLOOD PRESSURE: 71 MMHG | BODY MASS INDEX: 30.91 KG/M2 | HEIGHT: 62 IN | WEIGHT: 168 LBS | SYSTOLIC BLOOD PRESSURE: 120 MMHG

## 2018-12-28 VITALS — OXYGEN SATURATION: 100 % | TEMPERATURE: 67 F | DIASTOLIC BLOOD PRESSURE: 90 MMHG | SYSTOLIC BLOOD PRESSURE: 132 MMHG

## 2018-12-28 PROCEDURE — 2580000003 HC RX 258: Performed by: ANESTHESIOLOGY

## 2018-12-28 PROCEDURE — 3700000001 HC ADD 15 MINUTES (ANESTHESIA): Performed by: OTOLARYNGOLOGY

## 2018-12-28 PROCEDURE — 7100000001 HC PACU RECOVERY - ADDTL 15 MIN: Performed by: OTOLARYNGOLOGY

## 2018-12-28 PROCEDURE — 6360000002 HC RX W HCPCS: Performed by: NURSE ANESTHETIST, CERTIFIED REGISTERED

## 2018-12-28 PROCEDURE — 7100000010 HC PHASE II RECOVERY - FIRST 15 MIN: Performed by: OTOLARYNGOLOGY

## 2018-12-28 PROCEDURE — 6360000002 HC RX W HCPCS: Performed by: ANESTHESIOLOGY

## 2018-12-28 PROCEDURE — 3600000004 HC SURGERY LEVEL 4 BASE: Performed by: OTOLARYNGOLOGY

## 2018-12-28 PROCEDURE — 7100000000 HC PACU RECOVERY - FIRST 15 MIN: Performed by: OTOLARYNGOLOGY

## 2018-12-28 PROCEDURE — 2780000010 HC IMPLANT OTHER: Performed by: OTOLARYNGOLOGY

## 2018-12-28 PROCEDURE — 3600000014 HC SURGERY LEVEL 4 ADDTL 15MIN: Performed by: OTOLARYNGOLOGY

## 2018-12-28 PROCEDURE — 3700000000 HC ANESTHESIA ATTENDED CARE: Performed by: OTOLARYNGOLOGY

## 2018-12-28 PROCEDURE — 2709999900 HC NON-CHARGEABLE SUPPLY: Performed by: OTOLARYNGOLOGY

## 2018-12-28 PROCEDURE — 2580000003 HC RX 258: Performed by: OTOLARYNGOLOGY

## 2018-12-28 PROCEDURE — 6370000000 HC RX 637 (ALT 250 FOR IP): Performed by: ANESTHESIOLOGY

## 2018-12-28 DEVICE — VENT TUBE 1016040 5PK MOD GOODE T SIL
Type: IMPLANTABLE DEVICE | Status: FUNCTIONAL
Brand: GOODE T-TUBE®

## 2018-12-28 RX ORDER — SODIUM CHLORIDE, SODIUM LACTATE, POTASSIUM CHLORIDE, CALCIUM CHLORIDE 600; 310; 30; 20 MG/100ML; MG/100ML; MG/100ML; MG/100ML
INJECTION, SOLUTION INTRAVENOUS CONTINUOUS
Status: DISCONTINUED | OUTPATIENT
Start: 2018-12-28 | End: 2018-12-28 | Stop reason: HOSPADM

## 2018-12-28 RX ORDER — DIPHENHYDRAMINE HYDROCHLORIDE 50 MG/ML
12.5 INJECTION INTRAMUSCULAR; INTRAVENOUS
Status: DISCONTINUED | OUTPATIENT
Start: 2018-12-28 | End: 2018-12-28 | Stop reason: HOSPADM

## 2018-12-28 RX ORDER — ONDANSETRON 2 MG/ML
INJECTION INTRAMUSCULAR; INTRAVENOUS PRN
Status: DISCONTINUED | OUTPATIENT
Start: 2018-12-28 | End: 2018-12-28 | Stop reason: SDUPTHER

## 2018-12-28 RX ORDER — PROMETHAZINE HYDROCHLORIDE 25 MG/ML
6.25 INJECTION, SOLUTION INTRAMUSCULAR; INTRAVENOUS
Status: DISCONTINUED | OUTPATIENT
Start: 2018-12-28 | End: 2018-12-28 | Stop reason: HOSPADM

## 2018-12-28 RX ORDER — OXYCODONE HYDROCHLORIDE AND ACETAMINOPHEN 5; 325 MG/1; MG/1
1 TABLET ORAL
Status: COMPLETED | OUTPATIENT
Start: 2018-12-28 | End: 2018-12-28

## 2018-12-28 RX ORDER — FENTANYL CITRATE 50 UG/ML
25 INJECTION, SOLUTION INTRAMUSCULAR; INTRAVENOUS EVERY 5 MIN PRN
Status: COMPLETED | OUTPATIENT
Start: 2018-12-28 | End: 2018-12-28

## 2018-12-28 RX ORDER — MAGNESIUM HYDROXIDE 1200 MG/15ML
LIQUID ORAL CONTINUOUS PRN
Status: DISCONTINUED | OUTPATIENT
Start: 2018-12-28 | End: 2018-12-28 | Stop reason: HOSPADM

## 2018-12-28 RX ORDER — PROPOFOL 10 MG/ML
INJECTION, EMULSION INTRAVENOUS PRN
Status: DISCONTINUED | OUTPATIENT
Start: 2018-12-28 | End: 2018-12-28 | Stop reason: SDUPTHER

## 2018-12-28 RX ORDER — MIDAZOLAM HYDROCHLORIDE 1 MG/ML
2 INJECTION INTRAMUSCULAR; INTRAVENOUS ONCE
Status: COMPLETED | OUTPATIENT
Start: 2018-12-28 | End: 2018-12-28

## 2018-12-28 RX ORDER — FENTANYL CITRATE 50 UG/ML
25 INJECTION, SOLUTION INTRAMUSCULAR; INTRAVENOUS EVERY 5 MIN PRN
Status: DISCONTINUED | OUTPATIENT
Start: 2018-12-28 | End: 2018-12-28 | Stop reason: HOSPADM

## 2018-12-28 RX ORDER — DEXAMETHASONE SODIUM PHOSPHATE 10 MG/ML
INJECTION INTRAMUSCULAR; INTRAVENOUS PRN
Status: DISCONTINUED | OUTPATIENT
Start: 2018-12-28 | End: 2018-12-28 | Stop reason: SDUPTHER

## 2018-12-28 RX ADMIN — ONDANSETRON 4 MG: 2 INJECTION INTRAMUSCULAR; INTRAVENOUS at 10:22

## 2018-12-28 RX ADMIN — FENTANYL CITRATE 25 MCG: 50 INJECTION INTRAMUSCULAR; INTRAVENOUS at 11:05

## 2018-12-28 RX ADMIN — FENTANYL CITRATE 25 MCG: 50 INJECTION INTRAMUSCULAR; INTRAVENOUS at 10:46

## 2018-12-28 RX ADMIN — FENTANYL CITRATE 75 MCG: 50 INJECTION INTRAMUSCULAR; INTRAVENOUS at 10:44

## 2018-12-28 RX ADMIN — DEXAMETHASONE SODIUM PHOSPHATE 10 MG: 10 INJECTION INTRAMUSCULAR; INTRAVENOUS at 10:22

## 2018-12-28 RX ADMIN — FENTANYL CITRATE 25 MCG: 50 INJECTION INTRAMUSCULAR; INTRAVENOUS at 11:00

## 2018-12-28 RX ADMIN — FENTANYL CITRATE 25 MCG: 50 INJECTION, SOLUTION INTRAMUSCULAR; INTRAVENOUS at 11:10

## 2018-12-28 RX ADMIN — OXYCODONE HYDROCHLORIDE AND ACETAMINOPHEN 1 TABLET: 5; 325 TABLET ORAL at 11:23

## 2018-12-28 RX ADMIN — FENTANYL CITRATE 25 MCG: 50 INJECTION, SOLUTION INTRAMUSCULAR; INTRAVENOUS at 11:15

## 2018-12-28 RX ADMIN — SODIUM CHLORIDE, POTASSIUM CHLORIDE, SODIUM LACTATE AND CALCIUM CHLORIDE: 600; 310; 30; 20 INJECTION, SOLUTION INTRAVENOUS at 10:14

## 2018-12-28 RX ADMIN — MIDAZOLAM HYDROCHLORIDE 2 MG: 1 INJECTION, SOLUTION INTRAMUSCULAR; INTRAVENOUS at 08:41

## 2018-12-28 RX ADMIN — SODIUM CHLORIDE, POTASSIUM CHLORIDE, SODIUM LACTATE AND CALCIUM CHLORIDE: 600; 310; 30; 20 INJECTION, SOLUTION INTRAVENOUS at 08:40

## 2018-12-28 RX ADMIN — PROPOFOL 200 MG: 10 INJECTION, EMULSION INTRAVENOUS at 10:22

## 2018-12-28 ASSESSMENT — PAIN SCALES - GENERAL
PAINLEVEL_OUTOF10: 6
PAINLEVEL_OUTOF10: 7
PAINLEVEL_OUTOF10: 6
PAINLEVEL_OUTOF10: 1
PAINLEVEL_OUTOF10: 6
PAINLEVEL_OUTOF10: 6
PAINLEVEL_OUTOF10: 3

## 2018-12-28 ASSESSMENT — PULMONARY FUNCTION TESTS
PIF_VALUE: 27
PIF_VALUE: 10
PIF_VALUE: 15
PIF_VALUE: 24
PIF_VALUE: 4
PIF_VALUE: 17
PIF_VALUE: 13
PIF_VALUE: 17
PIF_VALUE: 17
PIF_VALUE: 2
PIF_VALUE: 1
PIF_VALUE: 1
PIF_VALUE: 10
PIF_VALUE: 4
PIF_VALUE: 31
PIF_VALUE: 10
PIF_VALUE: 21
PIF_VALUE: 27
PIF_VALUE: 10
PIF_VALUE: 4
PIF_VALUE: 15
PIF_VALUE: 4
PIF_VALUE: 27
PIF_VALUE: 20

## 2018-12-28 ASSESSMENT — ENCOUNTER SYMPTOMS
STRIDOR: 0
SHORTNESS OF BREATH: 0

## 2018-12-28 ASSESSMENT — PAIN - FUNCTIONAL ASSESSMENT: PAIN_FUNCTIONAL_ASSESSMENT: 0-10

## 2018-12-28 ASSESSMENT — PAIN DESCRIPTION - PAIN TYPE: TYPE: SURGICAL PAIN

## 2018-12-28 NOTE — ANESTHESIA PRE PROCEDURE
[address] COLONOSCOPY  2002    negative    FINGER TRIGGER RELEASE Right 11/28/2017    middle    MYRINGOTOMY AND TYMPANOSTOMY TUBE PLACEMENT Right 02/10/2017    OTHER SURGICAL HISTORY  02/10/2017    FESS    [place] INCISE FINGER TENDON SHEATH Right 11/28/2017    RIGHT MIDDLE FINGER TRIGGER RELEASE (3080 TABLE) performed by Yolanda Bryant DO at [address] [facility] [address] SCOPE,BX/RMV POLYP/DEBRID N/A 2/10/2017    ENDOSCOPIC SINUS SURGERY WITH STEALTH NAVIGATION, MAXILLARY ANTROSTOMY, ETHMOIDECTOMY, FRONTAL SINUS EXPLORATION, SPHENOIDOTOMY, BILATERAL TURBINATE REDUCTION, RIGHT EAR TUBE INSERTION performed by Shelli Yeboah MD at [facility] Right 2012    1 SURGERY AND SEVERAL INJECTIONS    TONSILLECTOMY  1966    WISDOM TOOTH EXTRACTION  1988    4 TEETH REMOVED       Social History:    Social History   Substance Use Topics    Smoking status: Never Smoker    Smokeless tobacco: Never Used    Alcohol use Yes      Comment: BEER WINE OR MIXED DRINKS 1 OR 2 A WEEK                                Counseling given: Not Answered      Vital Signs (Current):   Vitals:    12/27/18 1516   Weight: 168 lb (76.2 kg)   Height: 5' 2\" (1.575 m)                                              BP Readings from Last 3 Encounters:   12/20/17 (!) 135/99   12/04/17 116/72   11/28/17 104/71       NPO Status:                                                                                 BMI:   Wt Readings from Last 3 Encounters:   12/27/18 168 lb (76.2 kg)   01/03/18 162 lb 14.7 oz (73.9 kg)   12/20/17 163 lb (73.9 kg)     Body mass index is 30.73 kg/m².     CBC:   Lab Results   Component Value Date    WBC 6.5 10/17/2014    RBC 4.53 10/17/2014    HGB 12.4 10/17/2014    HCT 38.0 10/17/2014    MCV 84.1 10/17/2014    RDW 13.9 10/17/2014     10/17/2014       CMP:   Lab Results   Component Value Date     01/27/2017    K 4.0 01/27/2017    CL 99 01/27/2017    CO2 28 01/27/2017    BUN 15 01/27/2017

## 2018-12-28 NOTE — ANESTHESIA POSTPROCEDURE EVALUATION
Department of Anesthesiology  Postprocedure Note    Patient: Keri Martinez  MRN: [id number]  YOB: 1955  Date of evaluation: 12/28/2018  Time:  11:15 AM     Procedure Summary     Date:  12/28/18 Room / Location:  [address] OR    Anesthesia Start:  1018 Anesthesia Stop:  1050    Procedure:  MYRINGOTOMY WITH T- TUBE INSERTION, NASAL ENDOSCOPY WITH DEBRIDEMENT (Right ) Diagnosis:  (TINNITUS, EUSTACHIAN TUBE DISORDER, ORTALGIA RIGHT EAR)    Surgeon:  Christos Mancilla MD Responsible Provider:  Brendan Sellers MD    Anesthesia Type:  general, MAC ASA Status:  2          Anesthesia Type: general, MAC    Sandra Phase I: Sandra Score: 9    Sandra Phase II:      Last vitals: Reviewed and per EMR flowsheets.        Anesthesia Post Evaluation    Patient location during evaluation: PACU  Patient participation: complete - patient participated  Level of consciousness: awake  Airway patency: patent  Nausea & Vomiting: no nausea  Complications: no  Cardiovascular status: hemodynamically stable  Respiratory status: spontaneous ventilation  Hydration status: stable

## 2018-12-28 NOTE — H&P
History and Physical    Pt Name: Robert Campos  MRN: [id number]  YOB: 1955  Date of evaluation: 2018  Primary Care Physician: Juli Mckee MD  Patient evaluated at the request of  Dr. Rajani Sue    Reason for evaluation: RIGHT ear drainage abnormality   SUBJECTIVE:   History of Chief Complaint:      Julio Barrientos is a 59 y.o. female who presents s/p RIGHT ear tube placement 8 years ago, which she reports is still in place. Patient reports wooshing sounds and pressure in RIGHT ear. She also complains of pain in right ear. Patient has tried the valsalval maneuver with no relief. Past Medical History      has a past medical history of HTN (hypertension); Hyperlipidemia; IBS (irritable bowel syndrome); Ovarian cyst; Rotator cuff syndrome of right shoulder; and Wears glasses. Past Surgical History   has a past surgical history that includes Rotator cuff repair (Right, );  section (); Tonsillectomy (); Cholecystectomy, laparoscopic (); [place] tooth extraction (); Colonoscopy (); Myringotomy Tympanostomy Tube Placement (Right, 02/10/2017); other surgical history (02/10/2017); pr nasal scope,bx/rmv polyp/debrid (N/A, 2/10/2017); Finger trigger release (Right, 2017); and pr incise finger tendon sheath (Right, 2017). Medications   Scheduled Meds:  Continuous Infusions:   lactated ringers 100 mL/hr at 18 0840     PRN Meds:. Allergies  is allergic to seasonal.    Family History    family history includes COPD in her mother; Cancer in her maternal grandmother; Cancer (age of onset: 46) in her maternal cousin; High Blood Pressure in her maternal grandmother and mother; Other in her brother.     Family Status   Relation Status    Mother     Father    Jerry Gary Alive    MGM     Brother  at age 23    MGF     [place] PGF    [address]         Social History  Social

## 2018-12-31 ENCOUNTER — HOSPITAL ENCOUNTER (OUTPATIENT)
Dept: NUCLEAR MEDICINE | Age: 63
Discharge: HOME | End: 2019-01-02
Payer: COMMERCIAL

## 2018-12-31 ENCOUNTER — HOSPITAL ENCOUNTER (OUTPATIENT)
Dept: NON INVASIVE DIAGNOSTICS | Age: 63
Discharge: HOME | End: 2018-12-31
Payer: COMMERCIAL

## 2018-12-31 VITALS — DIASTOLIC BLOOD PRESSURE: 98 MMHG | RESPIRATION RATE: 17 BRPM | SYSTOLIC BLOOD PRESSURE: 133 MMHG | HEART RATE: 78 BPM

## 2018-12-31 LAB
LV EF: 65 %
LV EF: 70 %
LVEF MODALITY: NORMAL
LVEF MODALITY: NORMAL

## 2018-12-31 PROCEDURE — 93306 TTE W/DOPPLER COMPLETE: CPT

## 2018-12-31 PROCEDURE — 93017 CV STRESS TEST TRACING ONLY: CPT

## 2018-12-31 PROCEDURE — A9500 TC99M SESTAMIBI: HCPCS | Performed by: EMERGENCY MEDICINE

## 2018-12-31 PROCEDURE — 78452 HT MUSCLE IMAGE SPECT MULT: CPT

## 2018-12-31 PROCEDURE — 3430000000 HC RX DIAGNOSTIC RADIOPHARMACEUTICAL: Performed by: EMERGENCY MEDICINE

## 2018-12-31 PROCEDURE — 6360000002 HC RX W HCPCS: Performed by: EMERGENCY MEDICINE

## 2018-12-31 RX ORDER — 0.9 % SODIUM CHLORIDE 0.9 %
250 INTRAVENOUS SOLUTION INTRAVENOUS ONCE
Status: DISCONTINUED | OUTPATIENT
Start: 2018-12-31 | End: 2018-12-31 | Stop reason: SDUPTHER

## 2018-12-31 RX ORDER — AMINOPHYLLINE DIHYDRATE 25 MG/ML
100 INJECTION, SOLUTION INTRAVENOUS
Status: DISCONTINUED | OUTPATIENT
Start: 2018-12-31 | End: 2018-12-31 | Stop reason: SDUPTHER

## 2018-12-31 RX ORDER — NITROGLYCERIN 0.4 MG/1
0.4 TABLET SUBLINGUAL EVERY 5 MIN PRN
Status: DISCONTINUED | OUTPATIENT
Start: 2018-12-31 | End: 2019-01-01 | Stop reason: HOSPADM

## 2018-12-31 RX ORDER — AMINOPHYLLINE DIHYDRATE 25 MG/ML
100 INJECTION, SOLUTION INTRAVENOUS
Status: ACTIVE | OUTPATIENT
Start: 2018-12-31 | End: 2018-12-31

## 2018-12-31 RX ORDER — SODIUM CHLORIDE 0.9 % (FLUSH) 0.9 %
10 SYRINGE (ML) INJECTION PRN
Status: DISCONTINUED | OUTPATIENT
Start: 2018-12-31 | End: 2018-12-31 | Stop reason: SDUPTHER

## 2018-12-31 RX ORDER — 0.9 % SODIUM CHLORIDE 0.9 %
250 INTRAVENOUS SOLUTION INTRAVENOUS ONCE
Status: DISCONTINUED | OUTPATIENT
Start: 2018-12-31 | End: 2019-01-01 | Stop reason: HOSPADM

## 2018-12-31 RX ORDER — NITROGLYCERIN 0.4 MG/1
0.4 TABLET SUBLINGUAL EVERY 5 MIN PRN
Status: DISCONTINUED | OUTPATIENT
Start: 2018-12-31 | End: 2018-12-31 | Stop reason: SDUPTHER

## 2018-12-31 RX ORDER — SODIUM CHLORIDE 0.9 % (FLUSH) 0.9 %
10 SYRINGE (ML) INJECTION PRN
Status: DISCONTINUED | OUTPATIENT
Start: 2018-12-31 | End: 2019-01-01 | Stop reason: HOSPADM

## 2018-12-31 RX ORDER — METOPROLOL TARTRATE 5 MG/5ML
2.5 INJECTION INTRAVENOUS PRN
Status: DISCONTINUED | OUTPATIENT
Start: 2018-12-31 | End: 2019-01-01 | Stop reason: HOSPADM

## 2018-12-31 RX ORDER — METOPROLOL TARTRATE 5 MG/5ML
2.5 INJECTION INTRAVENOUS PRN
Status: DISCONTINUED | OUTPATIENT
Start: 2018-12-31 | End: 2018-12-31 | Stop reason: SDUPTHER

## 2018-12-31 RX ADMIN — REGADENOSON 0.4 MG: 0.08 INJECTION, SOLUTION INTRAVENOUS at 08:48

## 2018-12-31 RX ADMIN — TETRAKIS(2-METHOXYISOBUTYLISOCYANIDE)COPPER(I) TETRAFLUOROBORATE 39.8 MILLICURIE: 1 INJECTION, POWDER, LYOPHILIZED, FOR SOLUTION INTRAVENOUS at 12:35

## 2018-12-31 RX ADMIN — TETRAKIS(2-METHOXYISOBUTYLISOCYANIDE)COPPER(I) TETRAFLUOROBORATE 19.8 MILLICURIE: 1 INJECTION, POWDER, LYOPHILIZED, FOR SOLUTION INTRAVENOUS at 08:54

## 2020-01-23 ENCOUNTER — OFFICE VISIT (OUTPATIENT)
Dept: ORTHOPEDIC SURGERY | Age: 65
End: 2020-01-23
Payer: COMMERCIAL

## 2020-01-23 VITALS — HEIGHT: 62 IN | BODY MASS INDEX: 30.91 KG/M2 | WEIGHT: 167.99 LBS

## 2020-01-23 PROCEDURE — 99213 OFFICE O/P EST LOW 20 MIN: CPT | Performed by: ORTHOPAEDIC SURGERY

## 2020-01-23 ASSESSMENT — ENCOUNTER SYMPTOMS
SHORTNESS OF BREATH: 0
NAUSEA: 0
CHEST TIGHTNESS: 0
APNEA: 0
CONSTIPATION: 0
ABDOMINAL PAIN: 0
COLOR CHANGE: 0
ABDOMINAL DISTENTION: 0
COUGH: 0
DIARRHEA: 0
VOMITING: 0

## 2020-01-23 NOTE — PROGRESS NOTES
swelling and myalgias. Skin: Negative for color change and rash. Neurological: Negative for dizziness, weakness, numbness and headaches. Psychiatric/Behavioral: Negative for sleep disturbance. Past Medical History:    Past Medical History:   Diagnosis Date    HTN (hypertension) 2010    ON RX    Hyperlipidemia 2014    ON RX     IBS (irritable bowel syndrome)     Ovarian cyst 2008    reabsorbed    Rotator cuff syndrome of right shoulder     ON GOING HAS HAD SURGERY AND INJECTIONS    Wears glasses      Past Surgical History:    Past Surgical History:   Procedure Laterality Date     SECTION      CHOLECYSTECTOMY, LAPAROSCOPIC      COLONOSCOPY      negative    FINGER TRIGGER RELEASE Right 2017    middle    MYRINGOTOMY AND TYMPANOSTOMY TUBE PLACEMENT Right 02/10/2017    MYRINGOTOMY AND TYMPANOSTOMY TUBE PLACEMENT Right 2018    MYRINGOTOMY AND TYMPANOSTOMY TUBE PLACEMENT Right 2018    MYRINGOTOMY WITH T- TUBE INSERTION, NASAL ENDOSCOPY WITH DEBRIDEMENT performed by Jerry Zhao MD at [address] [place] [address] Right 2018    OTHER SURGICAL HISTORY  02/10/2017    FESS    [place] INCISE FINGER TENDON SHEATH Right 2017    RIGHT MIDDLE FINGER TRIGGER RELEASE (3080 TABLE) performed by Elly Gallagher DO at [address] SCOPE,BX/RMV POLYP/DEBRID N/A 2/10/2017    ENDOSCOPIC SINUS SURGERY WITH STEALTH NAVIGATION, MAXILLARY ANTROSTOMY, ETHMOIDECTOMY, FRONTAL SINUS EXPLORATION, SPHENOIDOTOMY, BILATERAL TURBINATE REDUCTION, RIGHT EAR TUBE INSERTION performed by Jerry Zhao MD at [facility]     1 SURGERY AND SEVERAL INJECTIONS    TONSILLECTOMY  1966    WISDOM TOOTH EXTRACTION  1988    4 TEETH REMOVED     Current Medications:   Current Outpatient Medications   Medication Sig Dispense Refill    cyclobenzaprine (FLEXERIL) 10 MG tablet Take 10 mg by mouth as needed.       hydrochlorothiazide (HYDRODIURIL) 25 Maternal Cousin 46        Colon CA    Cancer Maternal Grandmother         ovarian     High Blood Pressure Maternal Grandmother     Other Brother         CEREBAL PALSY   OF NATURAL CAUSES     I have reviewed the CC, HPI, ROS, PMH, FHX, Social History, and if not present in this note, I have reviewed in the patient's chart. I agree with the documentation provided by other staff and have reviewed their documentation prior to providing my signature indicating agreement. Vitals:   Ht 5' 2.01\" (1.575 m)   Wt 167 lb 15.9 oz (76.2 kg)   LMP 2008 (Within Days)   BMI 30.72 kg/m²  Body mass index is 30.72 kg/m². Physical Examination:     Orthopedics:    GENERAL: Alert and oriented X3 in no acute distress. SKIN: Intact without lesions or ulcerations. NEURO: Musculoskeletal and axillary nerves intact to sensory and motor testing. VASC: Capillary refill is less than 3 seconds. Right Shoulder Exam    GEN:  Alert and oriented X 3, in no acute distress. SKIN:  Intact without rashes, lesions, or ulcerations. NEURO:  Musculoskeletal ans axillary nerves intact to sensory and motor testing. VASC:  Cap refill less than than 3 secs. Negative Adson's test, Negative Leyda's test.  ROM: 120 degrees of forward elevation, 30 degrees of external rotation in neutral, 85 degrees of external rotation in abduction, internal rotation to back pocket. STRENGTH: Supraspinatus 4/5, external rotators 5/5. MUSC: No atrophy, negative subscap lift off or belly press test.  IMP: (+) Neer's sign, (+) Hawkin's sign, no painful arc, no pain with cross body abduction. PALP: no pain over anterolateral acromion, no pain over AC joint, no pain over traps/rhomboids. Pain over the bicep tendon. INST:  (-) [place]'s test, (+) Speed's test.  Assessment:     1. Traumatic arthritis of shoulder region    2. Sprain of right rotator cuff capsule, sequela    3.  Secondary adhesive capsulitis of shoulder, right      Procedures:    Procedure:

## 2020-01-27 ENCOUNTER — HOSPITAL ENCOUNTER (OUTPATIENT)
Dept: MAMMOGRAPHY | Age: 65
Discharge: HOME | End: 2020-01-29
Payer: COMMERCIAL

## 2020-01-27 PROCEDURE — 77067 SCR MAMMO BI INCL CAD: CPT

## 2020-02-05 ENCOUNTER — OFFICE VISIT (OUTPATIENT)
Dept: NEUROSURGERY | Age: 65
End: 2020-02-05
Payer: COMMERCIAL

## 2020-02-05 VITALS
HEART RATE: 83 BPM | DIASTOLIC BLOOD PRESSURE: 87 MMHG | BODY MASS INDEX: 29.99 KG/M2 | OXYGEN SATURATION: 100 % | SYSTOLIC BLOOD PRESSURE: 129 MMHG | WEIGHT: 164 LBS

## 2020-02-05 PROCEDURE — 99203 OFFICE O/P NEW LOW 30 MIN: CPT | Performed by: NEUROLOGICAL SURGERY

## 2020-02-05 NOTE — PROGRESS NOTES
[facility] [address]  MOB # 2 David [address] [address]  Dept: [phone]    Patient:  Alpa Swanson  YOB: 1955  Date: 20    The patient is a 62 y.o. female who presents today for consult of the following problems:     Chief Complaint   Patient presents with    Arm Pain     Numbness tingling and weakness bilateral hands     Numbness             HPI:     Alpa Swanson is a 62 y.o. female on whom neurosurgical consultation was requested by Jessee Gupta MD for management of carpal tunnel syndrome and cubital tunnel left. Almost 1 year now the patient has had progressive numbness and tingling in the ring and pinky finger of the left hand and mainly the index middle finger of the right hand with some intermittent symptoms in the thumbs as well. Symptoms have been progressive over the course of the last year where she has noticed significant weakness in  strength as well as difficulty with grasping and manipulating things with her hands. She had noticed that she had symptoms after shoulder surgery at which point she did undergo carpal tunnel release on the right side. Axial spasmodic neck pain that is unremitting without any specific radiation pattern. Pain appears to worsen with changes in position or rotation of the neck with no significant palliative factors. Has not had any response either as of thus far. Emily Figueroa       History:     Past Medical History:   Diagnosis Date    HTN (hypertension)     ON RX    Hyperlipidemia     ON RX     IBS (irritable bowel syndrome)     Ovarian cyst     reabsorbed    Rotator cuff syndrome of right shoulder     ON GOING HAS HAD SURGERY AND INJECTIONS    Wears glasses      Past Surgical History:   Procedure Laterality Date     SECTION  1987    CHOLECYSTECTOMY, LAPAROSCOPIC      COLONOSCOPY      negative    FINGER TRIGGER affect  Registration intact  Orientation intact  3 item recall intact  Judgement intact to situation    Cranial Nerves:   Pupils equal and reactive to light  Extraocular motion intact  Face and shrug symmetric  Tongue midline  No dysarthria  v1-3 sensation symmetric, masseter tone symmetric  Hearing symmetric and intact to finger rub    Sensation:   Diminished incisional distribution left side and median distribution right side mainly index finger diminished. No ring finger splint present. Motor  L deltoid 5/5; R deltoid 5/5  L biceps 5/5; R biceps 5/5  L triceps 5/5; R triceps 5/5  L wrist extension 5/5; R wrist extension 5/5  L intrinsics 5/5; R intrinsics 5/5     L iliopsoas 5/5 , R iliopsoas 5/5  L quadriceps 5/5; R quadriceps 5/5  L Dorsiflexion 5/5; R dorsiflexion 5/5  L Plantarflexion 5/5; R plantarflexion 5/5  L EHL 5/5; R EHL 5/5    Reflexes  L Brachioradialis 2+/4; R brachioradialis 2+/4  L Biceps 2+/4; R Biceps 2+/4  L Triceps 2+/4; R Triceps 2+/4  L Patellar 2+/4: R Patellar 2+/4  L Achilles 2+/4; R Achilles 2+/4    hoffmans L: neg  hoffmans R: neg  Clonus L: neg  Clonus R: neg  Babinski L: up  Babinski R; up    Positive Tinel's over left cubital tunnel, positive Tinel's over right carpal tunnel. Positive Phalen's bilaterally median nerve distribution. Negative Wartenberg negative Froment's negative Benedictine hand. Positive okay sign right. Studies Review:     MR cervical multilevel spondylosis. Assessment and Plan:      1. Cubital tunnel syndrome on left    2. Bilateral carpal tunnel syndrome          Plan:   I suspect that the patient clinically has cubital tunnel syndrome on the left side considering positive Tinel sign and distinct pattern. In addition I do suspect that she has recurrent carpal tunnel syndrome on the right side. Will obtain EMG bilateral upper extremities along with review of MRI cervical spine.   Considering progressive weakness in  strength she likely will need surgical decompression but may consider responding after EMG is done. Followup: No follow-ups on file. Prescriptions Ordered:  No orders of the defined types were placed in this encounter. Orders Placed:  Orders Placed This Encounter   Procedures    EMG     Standing Status:   Future     Standing Expiration Date:   2/5/2021     Order Specific Question:   Which body part? Answer:   bilateral upper extremities        Electronically signed by Junie Aguilar DO on 2/5/2020 at 4:50 PM    Please note that this chart was generated using voice recognition Dragon dictation software. Although every effort was made to ensure the accuracy of this automated transcription, some errors in transcription may have occurred.

## 2020-03-02 ENCOUNTER — OFFICE VISIT (OUTPATIENT)
Dept: OBGYN CLINIC | Age: 65
End: 2020-03-02
Payer: COMMERCIAL

## 2020-03-02 ENCOUNTER — HOSPITAL ENCOUNTER (OUTPATIENT)
Age: 65
Setting detail: SPECIMEN
Discharge: HOME | End: 2020-03-02
Payer: COMMERCIAL

## 2020-03-02 VITALS
SYSTOLIC BLOOD PRESSURE: 120 MMHG | BODY MASS INDEX: 35.14 KG/M2 | WEIGHT: 179 LBS | DIASTOLIC BLOOD PRESSURE: 70 MMHG | HEIGHT: 60 IN

## 2020-03-02 PROCEDURE — 99396 PREV VISIT EST AGE 40-64: CPT | Performed by: OBSTETRICS & GYNECOLOGY

## 2020-03-02 RX ORDER — PHENOL 1.4 %
1 AEROSOL, SPRAY (ML) MUCOUS MEMBRANE DAILY
COMMUNITY

## 2020-03-02 SDOH — HEALTH STABILITY: MENTAL HEALTH: HOW OFTEN DO YOU HAVE A DRINK CONTAINING ALCOHOL?: MONTHLY OR LESS

## 2020-03-02 SDOH — HEALTH STABILITY: MENTAL HEALTH: HOW MANY STANDARD DRINKS CONTAINING ALCOHOL DO YOU HAVE ON A TYPICAL DAY?: 1 OR 2

## 2020-03-02 ASSESSMENT — ENCOUNTER SYMPTOMS
NAUSEA: 0
DIARRHEA: 0
VOMITING: 0
ABDOMINAL PAIN: 0
COUGH: 0
WHEEZING: 0
CONSTIPATION: 0

## 2020-03-02 NOTE — PROGRESS NOTES
[facility]  [facility] [address] [address]  [place] [address]  Dept: [phone]  OF VISIT:  3/2/20        History and Physical    Lilian Swasnon    :  1955  CHIEF COMPLAINT:    Chief Complaint   Patient presents with    Annual Exam     presents annual exam today, last pap 17 n/N, Mamm 20                    HPI :   Lilian Swanson is a 62 y.o. female    The patient was seen and examined. Per the patient bowels are regular. She has novoiding complaints.   She denies any bloating as well as vaginal discharge.  _____________________________________________________________________  Past Medical History:   Diagnosis Date    HTN (hypertension)     ON RX    Hyperlipidemia     ON RX     IBS (irritable bowel syndrome)     Ovarian cyst     reabsorbed    Rotator cuff syndrome of right shoulder     ON GOING HAS HAD SURGERY AND INJECTIONS    Wears glasses                                                                    Past Surgical History:   Procedure Laterality Date     SECTION      CHOLECYSTECTOMY, LAPAROSCOPIC      COLONOSCOPY      negative    FINGER TRIGGER RELEASE Right 2017    middle    MYRINGOTOMY AND TYMPANOSTOMY TUBE PLACEMENT Right 02/10/2017    MYRINGOTOMY AND TYMPANOSTOMY TUBE PLACEMENT Right 2018    MYRINGOTOMY AND TYMPANOSTOMY TUBE PLACEMENT Right 2018    MYRINGOTOMY WITH T- TUBE INSERTION, NASAL ENDOSCOPY WITH DEBRIDEMENT performed by Herminio Paris MD at [address] [facility] Right 2018    OTHER SURGICAL HISTORY  02/10/2017    FESS    [place] INCISE FINGER TENDON SHEATH Right 2017    RIGHT MIDDLE FINGER TRIGGER RELEASE (3080 TABLE) performed by Gabriel Medina DO at [address] SCOPE,BX/RMV POLYP/DEBRID N/A 2/10/2017    ENDOSCOPIC SINUS SURGERY WITH STEALTH NAVIGATION, MAXILLARY ANTROSTOMY, ETHMOIDECTOMY, FRONTAL SINUS EXPLORATION, SPHENOIDOTOMY, BILATERAL TURBINATE REDUCTION, RIGHT EAR TUBE INSERTION performed by Gabino Chery MD at [facility] Right     1 SURGERY AND SEVERAL INJECTIONS    TONSILLECTOMY  1966    WISDOM TOOTH EXTRACTION      4 TEETH REMOVED     Family History   Problem Relation Age of Onset    High Blood Pressure Mother     COPD Mother     Cancer Maternal Cousin 46        Colon CA    Cancer Maternal Grandmother         ovarian     High Blood Pressure Maternal Grandmother     Other Brother         CEREBAL PALSY   OF NATURAL CAUSES     Social History     Tobacco Use   Smoking Status Never Smoker   Smokeless Tobacco Never Used     Social History     Substance and Sexual Activity   Alcohol Use Yes    Frequency: Monthly or less    Drinks per session: 1 or 2    Comment: BEER WINE OR MIXED DRINKS 1 OR 2 A WEEK     Current Outpatient Medications   Medication Sig Dispense Refill    calcium carbonate 600 MG TABS tablet Take 1 tablet by mouth daily      VITAMIN D PO Take 1 tablet by mouth daily      cyclobenzaprine (FLEXERIL) 10 MG tablet Take 10 mg by mouth as needed.  hydrochlorothiazide (HYDRODIURIL) 25 MG tablet Take 25 mg by mouth daily.  ibuprofen (ADVIL;MOTRIN) 800 MG tablet Take 800 mg by mouth every 6 hours as needed LAST DOSE 2018      loratadine (CLARITIN) 10 MG tablet Take 10 mg by mouth as needed Last dose 2018       No current facility-administered medications for this visit. Facility-Administered Medications Ordered in Other Visits   Medication Dose Route Frequency Provider Last Rate Last Dose    Bupivacaine HCl (PF) (MARCAINE) 0.75 % injection 225 mg  30 mL Intradermal Once Amanuel Rain DO           Allergies:  Seasonal    Gynecologic History:  Patient's last menstrual period was 2008 (within days).   Sexually Active: No  STD History: No      OB History    Para Term  AB Living   3 3 0 0 0 3   SAB TAB Ectopic Molar

## 2020-03-04 LAB
HPV SAMPLE: NORMAL
HPV, GENOTYPE 16: NOT DETECTED
HPV, GENOTYPE 18: NOT DETECTED
HPV, HIGH RISK OTHER: NOT DETECTED
HPV, INTERPRETATION: NORMAL
SPECIMEN DESCRIPTION: NORMAL

## 2020-03-10 LAB — CYTOLOGY REPORT: NORMAL

## 2020-03-17 ENCOUNTER — TELEPHONE (OUTPATIENT)
Dept: ORTHOPEDIC SURGERY | Age: 65
End: 2020-03-17

## 2020-05-04 ENCOUNTER — HOSPITAL ENCOUNTER (OUTPATIENT)
Age: 65
Discharge: HOME | End: 2020-05-04
Payer: COMMERCIAL

## 2020-05-04 PROCEDURE — U0003 INFECTIOUS AGENT DETECTION BY NUCLEIC ACID (DNA OR RNA); SEVERE ACUTE RESPIRATORY SYNDROME CORONAVIRUS 2 (SARS-COV-2) (CORONAVIRUS DISEASE [COVID-19]), AMPLIFIED PROBE TECHNIQUE, MAKING USE OF HIGH THROUGHPUT TECHNOLOGIES AS DESCRIBED BY CMS-2020-01-R: HCPCS

## 2020-05-07 LAB — SARS-COV-2, NAA: NOT DETECTED

## 2020-05-08 ENCOUNTER — TELEPHONE (OUTPATIENT)
Dept: PRIMARY CARE CLINIC | Age: 65
End: 2020-05-08

## 2020-07-13 ENCOUNTER — HOSPITAL ENCOUNTER (OUTPATIENT)
Age: 65
Setting detail: SPECIMEN
Discharge: HOME | End: 2020-07-13
Payer: COMMERCIAL

## 2020-07-15 ENCOUNTER — OFFICE VISIT (OUTPATIENT)
Dept: ORTHOPEDIC SURGERY | Age: 65
End: 2020-07-15
Payer: COMMERCIAL

## 2020-07-15 VITALS
BODY MASS INDEX: 35.14 KG/M2 | WEIGHT: 179 LBS | HEART RATE: 77 BPM | SYSTOLIC BLOOD PRESSURE: 131 MMHG | HEIGHT: 60 IN | DIASTOLIC BLOOD PRESSURE: 92 MMHG

## 2020-07-15 PROCEDURE — 99212 OFFICE O/P EST SF 10 MIN: CPT | Performed by: PHYSICIAN ASSISTANT

## 2020-07-15 ASSESSMENT — ENCOUNTER SYMPTOMS
ABDOMINAL DISTENTION: 0
CONSTIPATION: 0
COUGH: 0
CHEST TIGHTNESS: 0
ABDOMINAL PAIN: 0
NAUSEA: 0
DIARRHEA: 0
VOMITING: 0
APNEA: 0
COLOR CHANGE: 0
SHORTNESS OF BREATH: 0

## 2020-07-15 NOTE — PROGRESS NOTES
Skin: Negative for color change and rash. Neurological: Negative for dizziness, weakness, numbness and headaches. Psychiatric/Behavioral: Negative for sleep disturbance.        Past Medical History:    Past Medical History:   Diagnosis Date    HTN (hypertension)     ON RX    Hyperlipidemia     ON RX     IBS (irritable bowel syndrome)     Ovarian cyst 2008    reabsorbed    Rotator cuff syndrome of right shoulder     ON GOING HAS HAD SURGERY AND INJECTIONS    Wears glasses        Past Surgical History:    Past Surgical History:   Procedure Laterality Date     SECTION      CHOLECYSTECTOMY, LAPAROSCOPIC      COLONOSCOPY      negative    FINGER TRIGGER RELEASE Right 2017    middle    MYRINGOTOMY AND TYMPANOSTOMY TUBE PLACEMENT Right 02/10/2017    MYRINGOTOMY AND TYMPANOSTOMY TUBE PLACEMENT Right 2018    MYRINGOTOMY AND TYMPANOSTOMY TUBE PLACEMENT Right 2018    MYRINGOTOMY WITH T- TUBE INSERTION, NASAL ENDOSCOPY WITH DEBRIDEMENT performed by Ori Valentine MD at [address] [place] [address] Right 2018    OTHER SURGICAL HISTORY  02/10/2017    FESS    [place] INCISE FINGER TENDON SHEATH Right 2017    RIGHT MIDDLE FINGER TRIGGER RELEASE (3080 TABLE) performed by Marja Boxer, DO at [address]  SCOPE,BX/RMV POLYP/DEBRID N/A 2/10/2017    ENDOSCOPIC SINUS SURGERY WITH STEALTH NAVIGATION, MAXILLARY ANTROSTOMY, ETHMOIDECTOMY, FRONTAL SINUS EXPLORATION, SPHENOIDOTOMY, BILATERAL TURBINATE REDUCTION, RIGHT EAR TUBE INSERTION performed by Ori Valentine MD at [address] [place] [address] Right     1 SURGERY AND SEVERAL INJECTIONS    TONSILLECTOMY  1966    WISDOM TOOTH EXTRACTION  1988    4 TEETH REMOVED       CurrentMedications:   Current Outpatient Medications   Medication Sig Dispense Refill    calcium carbonate 600 MG TABS tablet Take 1 tablet by mouth daily      VITAMIN D PO Take 1 tablet by mouth daily      cyclobenzaprine (FLEXERIL) 10 MG tablet Take 10 mg by mouth as needed.  hydrochlorothiazide (HYDRODIURIL) 25 MG tablet Take 25 mg by mouth daily.  ibuprofen (ADVIL;MOTRIN) 800 MG tablet Take 800 mg by mouth every 6 hours as needed LAST DOSE 12/21/2018      loratadine (CLARITIN) 10 MG tablet Take 10 mg by mouth as needed Last dose 12/26/2018       No current facility-administered medications for this visit.       Facility-Administered Medications Ordered in Other Visits   Medication Dose Route Frequency Provider Last Rate Last Dose    Bupivacaine HCl (PF) (MARCAINE) 0.75 % injection 225 mg  30 mL Intradermal Once Dacia Friedman DO           Allergies:    Seasonal    Social History:   Social History     Socioeconomic History    Marital status:      Spouse name: None    Number of children: None    Years of education: None    Highest education level: None   Occupational History    None   Social Needs    Financial resource strain: None    Food insecurity     Worry: None     Inability: None    Transportation needs     Medical: None     Non-medical: None   Tobacco Use    Smoking status: Never Smoker    Smokeless tobacco: Never Used   Substance and Sexual Activity    Alcohol use: Yes     Frequency: Monthly or less     Drinks per session: 1 or 2     Comment: BEER WINE OR MIXED DRINKS 1 OR 2 A WEEK    Drug use: No    Sexual activity: Yes     Partners: Male   Lifestyle    Physical activity     Days per week: None     Minutes per session: None    Stress: None   Relationships    Social connections     Talks on phone: None     Gets together: None     Attends Hinduism service: None     Active member of club or organization: None     Attends meetings of clubs or organizations: None     Relationship status: None    Intimate partner violence     Fear of current or ex partner: None     Emotionally abused: None     Physically abused: None     Forced sexual activity: None   Other Topics Concern    None   Social History Narrative    None       Family History:  Family History   Problem Relation Age of Onset    High Blood Pressure Mother     COPD Mother     Cancer Maternal Cousin 46        Colon CA    Cancer Maternal Grandmother         ovarian     High Blood Pressure Maternal Grandmother     Other Brother         CEREBAL PALSY   OF NATURAL CAUSES       I have reviewed the CC, HPI, ROS, PMH, FHX, Social History, and if not present in this note, I have reviewed in the patient's chart. I agree with the documentation provided by other staff and have reviewed their documentation prior to providing my signature indicating agreement. Vitals:   BP (!) 131/92 (Site: Left Upper Arm)   Pulse 77   Ht 5' (1.524 m)   Wt 179 lb (81.2 kg)   LMP 2008 (Within Days)   BMI 34.96 kg/m²  Body mass index is 34.96 kg/m². Physical Examination:     Orthopedics:    GENERAL: Alert and oriented X3 in no acute distress. SKIN: Intact without lesions or ulcerations. NEURO: Musculoskeletal and axillary nerves intact to sensory and motor testing. VASC: Capillary refill is less than 3 seconds. Right Shoulder Exam    GEN: Alert and oriented X 3, in no acute distress. SKIN: Intact without rashes, lesions, or ulcerations. NEURO: Musculoskeletal ans axillary nerves intact to sensory and motor testing. VASC: Cap refill less than than 3 secs. Negative Adson's test, Negative Leyda's test.  ROM: 100 degrees of forward elevation active and 120 passive, 30 degrees of external rotation in neutral, 80 degrees of external rotation in abduction, internal rotation to L5. STRENGTH: Supraspinatus 4+/5, external rotators 4+/5. MUSC: No atrophy, negative subscap lift off or belly press test.  IMP: + Neer's sign, + Hawkin's sign, - Coracoid impingement, - painful arc, - pain with cross body abduction. PALP:  pain over anterolateral acromion, - pain over AC joint, - pain over traps/rhomboids.  Anterior Shoulder tenderness to palpation  INST: - Taylor's test, + Speed's test, - sulcus in ext. rot, - apprehension, - relocation, , - load and shift, -crank test.  Assessment:     1. Traumatic arthropathy, shoulder region    2. Secondary adhesive capsulitis of shoulder, right      Procedures:    Procedure: no  Radiology:   Previous x-rays reviewed  Plan:   Treatment : I reviewed the X-ray with the patient. We discussed the etiologies and natural histories of traumatic arthropathy of the right shoulder. We discussed the various treatment alternatives including anti-inflammatory medications, physical therapy, injections, further imaging studies and as a last result surgery. During today's visit, I discussed that we need to ask for an extension on her c-9 as it has lapsed and she cannot get injection today. Once we get that extension we will call her back for injection. I would advise tylenol arthritis for pain relief. At this time, the patient has opted for getting extension on C-9 for injection approval. A physical therapy prescription was not given. Patient should return to the clinic in once c-9 approved for injection  to follow up with Hortencia Gupta PA-C. The patient will call the office immediately with any problems. No orders of the defined types were placed in this encounter. No orders of the defined types were placed in this encounter. I, Vishal Moe MS, AT, ATC, am scribing for and in the presence of Hortencia Gupta PA-C. 7/16/2020  3:00 PM    Electronically signed by Beatriz Carlos PA-C, on 7/16/2020 at 3:00 PM     I, Hortencia Gupta PA-C, have personally seen this patient, reviewed the chart including history, and imaging if done. I personally  performed the physical exam and obtained any needed additional history. I placed orders, performed or supervised procedures and developed the treatment plan.     Electronically signed by Beatriz Carlos PA-C, on 7/16/2020 at 3:01 PM

## 2020-07-16 LAB — SURGICAL PATHOLOGY REPORT: NORMAL

## 2020-07-29 ENCOUNTER — OFFICE VISIT (OUTPATIENT)
Dept: ORTHOPEDIC SURGERY | Age: 65
End: 2020-07-29
Payer: COMMERCIAL

## 2020-07-29 VITALS
HEART RATE: 75 BPM | WEIGHT: 179 LBS | SYSTOLIC BLOOD PRESSURE: 129 MMHG | DIASTOLIC BLOOD PRESSURE: 103 MMHG | HEIGHT: 60 IN | BODY MASS INDEX: 35.14 KG/M2

## 2020-07-29 PROCEDURE — 99213 OFFICE O/P EST LOW 20 MIN: CPT | Performed by: PHYSICIAN ASSISTANT

## 2020-07-29 PROCEDURE — 20611 DRAIN/INJ JOINT/BURSA W/US: CPT | Performed by: PHYSICIAN ASSISTANT

## 2020-07-29 RX ORDER — LIDOCAINE HYDROCHLORIDE 10 MG/ML
4 INJECTION, SOLUTION INFILTRATION; PERINEURAL ONCE
Status: COMPLETED | OUTPATIENT
Start: 2020-07-29 | End: 2020-07-30

## 2020-07-29 RX ORDER — METHYLPREDNISOLONE ACETATE 40 MG/ML
40 INJECTION, SUSPENSION INTRA-ARTICULAR; INTRALESIONAL; INTRAMUSCULAR; SOFT TISSUE ONCE
Status: COMPLETED | OUTPATIENT
Start: 2020-07-29 | End: 2020-07-30

## 2020-07-29 ASSESSMENT — ENCOUNTER SYMPTOMS
ABDOMINAL DISTENTION: 0
DIARRHEA: 0
APNEA: 0
NAUSEA: 0
CONSTIPATION: 0
COUGH: 0
COLOR CHANGE: 0
SHORTNESS OF BREATH: 0
CHEST TIGHTNESS: 0
VOMITING: 0
ABDOMINAL PAIN: 0

## 2020-07-29 NOTE — PATIENT INSTRUCTIONS
CORTISONE INJECTION CARE     The injection site should never get red, hot, or swollen and if it does the patient will contact our office right away. The patient may experience a increase in soreness the first 24-48 hours due to a cortisone flair and can take anti-inflammatories for a short period of time to reduce that soreness. The patient should not submerge the injection site in water for a minimum of 24 hours to avoid infection. This means no lakes, pools, ponds, or hot tubs for 24 hours. If the patient is diabetic the injection may increase their blood sugar for up to one week. The patient can do this cortisone injection once every 3 months as needed. -------------------------------------------------------------------------------------------------------------------------  Patient Education     Knee Arthritis: Exercises  Introduction  Here are some examples of exercises for you to try. The exercises may be suggested for a condition or for rehabilitation. Start each exercise slowly. Ease off the exercises if you start to have pain. You will be told when to start these exercises and which ones will work best for you. How to do the exercises  Knee flexion with heel slide   1. Lie on your back with your knees bent. 2. Slide your heel back by bending your affected knee as far as you can. Then hook your other foot around your ankle to help pull your heel even farther back. 3. Hold for about 6 seconds, then rest for up to 10 seconds. 4. Repeat 8 to 12 times. 5. Switch legs and repeat steps 1 through 4, even if only one knee is sore. Quad sets   1. Sit with your affected leg straight and supported on the floor or a firm bed. Place a small, rolled-up towel under your knee. Your other leg should be bent, with that foot flat on the floor. 2. Tighten the thigh muscles of your affected leg by pressing the back of your knee down into the towel.   3. Hold for about 6 seconds, then rest for up to 10 seconds. 4. Repeat 8 to 12 times. 5. Switch legs and repeat steps 1 through 4, even if only one knee is sore. Straight-leg raises to the front   1. Lie on your back with your good knee bent so that your foot rests flat on the floor. Your affected leg should be straight. Make sure that your low back has a normal curve. You should be able to slip your hand in between the floor and the small of your back, with your palm touching the floor and your back touching the back of your hand. 2. Tighten the thigh muscles in your affected leg by pressing the back of your knee flat down to the floor. Hold your knee straight. 3. Keeping the thigh muscles tight and your leg straight, lift your affected leg up so that your heel is about 12 inches off the floor. Hold for about 6 seconds, then lower slowly. 4. Relax for up to 10 seconds between repetitions. 5. Repeat 8 to 12 times. 6. Switch legs and repeat steps 1 through 5, even if only one knee is sore. Active knee flexion   1. Lie on your stomach with your knees straight. If your kneecap is uncomfortable, roll up a washcloth and put it under your leg just above your kneecap. 2. Lift the foot of your affected leg by bending the knee so that you bring the foot up toward your buttock. If this motion hurts, try it without bending your knee quite as far. This may help you avoid any painful motion. 3. Slowly move your leg up and down. 4. Repeat 8 to 12 times. 5. Switch legs and repeat steps 1 through 4, even if only one knee is sore. Quadriceps stretch (facedown)   1. Lie flat on your stomach, and rest your face on the floor. 2. Wrap a towel or belt strap around the lower part of your affected leg. Then use the towel or belt strap to slowly pull your heel toward your buttock until you feel a stretch. 3. Hold for about 15 to 30 seconds, then relax your leg against the towel or belt strap. 4. Repeat 2 to 4 times.   5. Switch legs and repeat steps 1 through 4, even if only one knee is sore. Stationary exercise bike   1. If you do not have a stationary exercise bike at home, you can find one to ride at your local health club or community center. 2. Adjust the height of the bike seat so that your knee is slightly bent when your leg is extended downward. If your knee hurts when the pedal reaches the top, you can raise the seat so that your knee does not bend as much. 3. Start slowly. At first, try to do 5 to 10 minutes of cycling with little to no resistance. Then increase your time and the resistance bit by bit until you can do 20 to 30 minutes without pain. 4. If you start to have pain, rest your knee until your pain gets back to the level that is normal for you. Or cycle for less time or with less effort. Follow-up care is a key part of your treatment and safety. Be sure to make and go to all appointments, and call your doctor if you are having problems. It's also a good idea to know your test results and keep a list of the medicines you take. Where can you learn more? Go to [email or web address]. org and sign in to your [employer] account. Enter C159 in the [employer] box to learn more about \"Knee Arthritis: Exercises. \"     If you do not have an account, please click on the \"Sign Up Now\" link. Current as of: March 2, 2020               Content Version: 12.5  © 9477-4691 [employer]. Care instructions adapted under license by [facility] ([facility]). If you have questions about a medical condition or this instruction, always ask your healthcare professional. [address] any warranty or liability for your use of this information.

## 2020-07-29 NOTE — PROGRESS NOTES
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 The pain has been present for 3 weeks since 07/05/2020. The patient recalls no specific injury. The patient has tried tramadol, heat, ice, NSAID's, walking and rest with mild improvement. The pain is now described as Jayla Jayme and Sharp. There is pain on weight bearing. The knee has swelled. There is not painful popping and clicking. The knee has not caught or locked up. The knee has not given out. It is stiff upon arising from sitting. It is painful to go up and down stairs and sit for a prolonged time. The patient has not had a cortisone injection. The patient has not tried a lubrication injection. The patient has not tried physical therapy. The patient has not had surgery. ROS:   Review of Systems   Constitutional: Positive for activity change. Negative for appetite change, fatigue and fever. Respiratory: Negative for apnea, cough, chest tightness and shortness of breath. Cardiovascular: Negative for chest pain, palpitations and leg swelling. Gastrointestinal: Negative for abdominal distention, abdominal pain, constipation, diarrhea, nausea and vomiting. Genitourinary: Negative for difficulty urinating, dysuria and hematuria. Musculoskeletal: Positive for arthralgias. Negative for gait problem, joint swelling and myalgias. Skin: Negative for color change and rash. Neurological: Negative for dizziness, weakness, numbness and headaches. Psychiatric/Behavioral: Negative for sleep disturbance.      Past Medical History:    Past Medical History: Diagnosis Date    HTN (hypertension)     ON RX    Hyperlipidemia     ON RX     IBS (irritable bowel syndrome)     Ovarian cyst     reabsorbed    Rotator cuff syndrome of right shoulder     ON GOING HAS HAD SURGERY AND INJECTIONS    Wears glasses      Past Surgical History:    Past Surgical History:   Procedure Laterality Date     SECTION      CHOLECYSTECTOMY, LAPAROSCOPIC      COLONOSCOPY      negative    FINGER TRIGGER RELEASE Right 2017    middle    MYRINGOTOMY AND TYMPANOSTOMY TUBE PLACEMENT Right 02/10/2017    MYRINGOTOMY AND TYMPANOSTOMY TUBE PLACEMENT Right 2018    MYRINGOTOMY AND TYMPANOSTOMY TUBE PLACEMENT Right 2018    MYRINGOTOMY WITH T- TUBE INSERTION, NASAL ENDOSCOPY WITH DEBRIDEMENT performed by Brandon Seay MD at [address] Right 2018    OTHER SURGICAL HISTORY  02/10/2017    FESS    SC INCISE FINGER TENDON SHEATH Right 2017    RIGHT MIDDLE FINGER TRIGGER RELEASE (3080 TABLE) performed by Lisandro Pierre DO at [address] SCOPE,BX/RMV POLYP/DEBRID N/A 2/10/2017    ENDOSCOPIC SINUS SURGERY WITH STEALTH NAVIGATION, MAXILLARY ANTROSTOMY, ETHMOIDECTOMY, FRONTAL SINUS EXPLORATION, SPHENOIDOTOMY, BILATERAL TURBINATE REDUCTION, RIGHT EAR TUBE INSERTION performed by Brandon Seay MD at [facility] Right     1 SURGERY AND SEVERAL INJECTIONS    TONSILLECTOMY  1966    WISDOM TOOTH EXTRACTION  1988    4 TEETH REMOVED     Current Medications:   Current Outpatient Medications   Medication Sig Dispense Refill    calcium carbonate 600 MG TABS tablet Take 1 tablet by mouth daily      VITAMIN D PO Take 1 tablet by mouth daily      cyclobenzaprine (FLEXERIL) 10 MG tablet Take 10 mg by mouth as needed.  hydrochlorothiazide (HYDRODIURIL) 25 MG tablet Take 25 mg by mouth daily.         ibuprofen (ADVIL;MOTRIN) 800 MG tablet Take 800 mg by mouth every 6 hours as needed LAST DOSE 12/21/2018      loratadine (CLARITIN) 10 MG tablet Take 10 mg by mouth as needed Last dose 12/26/2018       No current facility-administered medications for this visit.       Facility-Administered Medications Ordered in Other Visits   Medication Dose Route Frequency Provider Last Rate Last Dose    Bupivacaine HCl (PF) (MARCAINE) 0.75 % injection 225 mg  30 mL Intradermal Once Marcela DO Az           Allergies:    Seasonal    Social History:   Social History     Socioeconomic History    Marital status:      Spouse name: Not on file    Number of children: Not on file    Years of education: Not on file    Highest education level: Not on file   Occupational History    Not on file   Social Needs    Financial resource strain: Not on file    Food insecurity     Worry: Not on file     Inability: Not on file    Transportation needs     Medical: Not on file     Non-medical: Not on file   Tobacco Use    Smoking status: Never Smoker    Smokeless tobacco: Never Used   Substance and Sexual Activity    Alcohol use: Yes     Frequency: Monthly or less     Drinks per session: 1 or 2     Comment: BEER WINE OR MIXED DRINKS 1 OR 2 A WEEK    Drug use: No    Sexual activity: Yes     Partners: Male   Lifestyle    Physical activity     Days per week: Not on file     Minutes per session: Not on file    Stress: Not on file   Relationships    Social connections     Talks on phone: Not on file     Gets together: Not on file     Attends Sabianism service: Not on file     Active member of club or organization: Not on file     Attends meetings of clubs or organizations: Not on file     Relationship status: Not on file    Intimate partner violence     Fear of current or ex partner: Not on file     Emotionally abused: Not on file     Physically abused: Not on file     Forced sexual activity: Not on file   Other Topics Concern    Not on file   Social History Narrative    Not on file       Family History:  Family History   Problem Relation Age of Onset    High Blood Pressure Mother     COPD Mother     Cancer Maternal Cousin 46        Colon CA    Cancer Maternal Grandmother         ovarian     High Blood Pressure Maternal Grandmother     Other Brother         CEREBAL PALSY   OF NATURAL CAUSES       Vitals:   BP (!) 129/103   Pulse 75   Ht 5' (1.524 m)   Wt 179 lb (81.2 kg)   LMP 2008 (Within Days)   BMI 34.96 kg/m²  Body mass index is 34.96 kg/m². Physical Examination:     Orthopedics:    GENERAL: Alert and oriented X3 in no acute distress. SKIN: Intact without lesions or ulcerations. NEURO: Intact to sensory and motor testing. VASC: Capillary refill is less than 3 seconds. KNEE EXAM    LOCATION: Right Knee  GEN: Alert and oriented X 3, in no acute distress. GAIT: The patient's gait was observed while entering the exam room and was noted to be non antalgic. The extremity is in varus alignment. SKIN: Intact without rashes, lesions, or ulcerations. No obvious deformity or swelling. NEURO: The patient responds to light touch throughout right LE. Patellar and Achilles reflexes are 2/4. VASC: The right LE is neurovascularly intact with 2/4 DP and 2/4 PT pulses. Brisk capillary refill. ROM: 0/107 degrees. There is no effusion. MUSC: slightly decreased quad tone  LIGAMENT: Lachman's test is Negative with Good endpoint. Anterior drawer Negative. Posterior drawer Negative. There is No varus instability at 0 degrees and No varus instability at 30 degrees. There is No valgus instability at 0 degrees and No valgus instability at 30 degrees. TESTS: Diamond testing reveals crepitation and pain with no clunks. PALP: There is medial and lateral joint line pain. Assessment:     1. Primary osteoarthritis of right knee      Procedures:    Procedure: yes    Regular Knee Injection    Location: Right Knee  Procedure: Corticosteroid injection into the knee.  The patient was placed in the Supine position on the exam table. The superior lateral portal was identified and marked. The skin was prepped with betadine in a sterile fashion. Utilizing ultrasound for precise placement and clean technique with sterile gloves a 5cc solution containing 4cc of 1.0% Lidocaine with 1cc containing 40mg of Depo-medrol  was injected. There was no resistance to the injection. The wound was cleansed and a band-aid was placed. the patient tolerated the procedure without difficulty. Adverse reactions to the injection were discussed with the patient including signs of infection (increasing pain, redness, swelling) and the patient was instructed to call immediately if experiencing any of these symptoms. Radiology:   KNEE X-RAY    4 views of the right knee including AP, bilateral tunnel, and lateral in the upright position, and skyline views reveal varus alignment with no fracture or dislocation. Kellgren grade IV changes of osteoarthritis (joint space narrowing, osteophyte, subchondral sclerosis, bony deformity/cyst) of the tricompartment(s). No osseous loose bodies. No bony erosion or periosteal reaction. No soft tissue masses. Impression: Severe osteoarthritis of the right knee. Plan:   Treatment : I reviewed the X-ray with the patient and I informed them that the knees have reached end stage osteoarthritis and that means she is at a Kellgren grade IV. We discussed the etiologies and natural histories of Primary osteoarthritis of the right knee. We discussed the various treatment alternatives including anti-inflammatory medications, physical therapy, injections, further imaging studies and as a last result surgery.  During today's visit, I explained to the patient that with the condition of her arthritis in the knee, the best thing for her knee pain that will work long term is a total knee replacement but if she is not ready to have that surgery done, the fastest thing that we can do today is a cortisone injection. I also told her that we can have her do physical therapy as well but it is like a double edged sword because the arthritis can become aggravated. I then asked her if she is interested in attending physical therapy and the patient stated that she is not interested in doing it. I then told her that I can give her exercises that she can do at home so she may reduce her pain. The patient then stated that she is willing to try the exercises and the cortisone injection today as well. So at this time, the patient has opted for knee exercises and a cortisone injection into the right knee to help reduce inflammation and pain. The injection site should never get red, hot, or swollen and if it does the patient will contact our office right away. The patient may experience a increase in soreness the first 24-48 hours due to a cortisone flair and can take anti-inflammatories for a short period of time to reduce that soreness. The patient should not submerge the injection site in water for a minimum of 24 hours to avoid infection. This means no lakes, pools, ponds, or hot tubs for 24 hours. If the patient is diabetic the injection may increase their blood sugar for up to one week. The patient can do this cortisone injection once every 3 months as needed. If the injections stop working and do not give the patient relief the patient should consider surgical interventions to produce long term relief. Patient should return to the clinic PRN. The patient will call the office immediately with any problems. No orders of the defined types were placed in this encounter. No orders of the defined types were placed in this encounter. Cee Anderson Day V, am scribing for and in the presence of Evelyn Lozano PA-C. 7/29/2020  5:05 PM    I, Evelyn Lozano PA-C, have personally seen this patient, reviewed the chart including history, and imaging if done.  I personally  performed the physical exam and obtained any needed additional history. I placed orders, performed or supervised procedures and developed the treatment plan.     Electronically signed by Stephanie Wright PA-C, on 7/29/2020 at 5:05 PM      Electronically signed by Stephanie Wright PA-C, on 7/29/2020 at 5:05 PM

## 2020-07-30 RX ADMIN — METHYLPREDNISOLONE ACETATE 40 MG: 40 INJECTION, SUSPENSION INTRA-ARTICULAR; INTRALESIONAL; INTRAMUSCULAR; SOFT TISSUE at 09:42

## 2020-07-30 RX ADMIN — LIDOCAINE HYDROCHLORIDE 4 ML: 10 INJECTION, SOLUTION INFILTRATION; PERINEURAL at 09:39

## 2020-08-04 ENCOUNTER — OFFICE VISIT (OUTPATIENT)
Dept: ORTHOPEDIC SURGERY | Age: 65
End: 2020-08-04
Payer: COMMERCIAL

## 2020-08-04 VITALS — WEIGHT: 179 LBS | BODY MASS INDEX: 35.14 KG/M2 | HEIGHT: 60 IN | TEMPERATURE: 97 F

## 2020-08-04 PROCEDURE — 20611 DRAIN/INJ JOINT/BURSA W/US: CPT | Performed by: PHYSICIAN ASSISTANT

## 2020-08-04 RX ORDER — LIDOCAINE HYDROCHLORIDE 10 MG/ML
4 INJECTION, SOLUTION INFILTRATION; PERINEURAL ONCE
Status: COMPLETED | OUTPATIENT
Start: 2020-08-04 | End: 2020-08-04

## 2020-08-04 RX ORDER — METHYLPREDNISOLONE ACETATE 40 MG/ML
40 INJECTION, SUSPENSION INTRA-ARTICULAR; INTRALESIONAL; INTRAMUSCULAR; SOFT TISSUE ONCE
Status: COMPLETED | OUTPATIENT
Start: 2020-08-04 | End: 2020-08-04

## 2020-08-04 RX ADMIN — METHYLPREDNISOLONE ACETATE 40 MG: 40 INJECTION, SUSPENSION INTRA-ARTICULAR; INTRALESIONAL; INTRAMUSCULAR; SOFT TISSUE at 16:41

## 2020-08-04 RX ADMIN — LIDOCAINE HYDROCHLORIDE 4 ML: 10 INJECTION, SOLUTION INFILTRATION; PERINEURAL at 16:41

## 2020-08-04 NOTE — PATIENT INSTRUCTIONS
Patient Education        Neck Strain or Sprain: Rehab Exercises  Introduction  Here are some examples of exercises for you to try. The exercises may be suggested for a condition or for rehabilitation. Start each exercise slowly. Ease off the exercises if you start to have pain. You will be told when to start these exercises and which ones will work best for you. How to do the exercises  Neck rotation   1. Sit in a firm chair, or stand up straight. 2. Keeping your chin level, turn your head to the right, and hold for 15 to 30 seconds. 3. Turn your head to the left and hold for 15 to 30 seconds. 4. Repeat 2 to 4 times to each side. Neck stretches   1. Look straight ahead, and tip your right ear to your right shoulder. Do not let your left shoulder rise up as you tip your head to the right. 2. Hold for 15 to 30 seconds. 3. Tilt your head to the left. Do not let your right shoulder rise up as you tip your head to the left. 4. Hold for 15 to 30 seconds. 5. Repeat 2 to 4 times to each side. Forward neck flexion   1. Sit in a firm chair, or stand up straight. 2. Bend your head forward. 3. Hold for 15 to 30 seconds. 4. Repeat 2 to 4 times. Lateral (side) bend strengthening   1. With your right hand, place your first two fingers on your right temple. 2. Start to bend your head to the side while using gentle pressure from your fingers to keep your head from bending. 3. Hold for about 6 seconds. 4. Repeat 8 to 12 times. 5. Switch hands and repeat the same exercise on your left side. Forward bend strengthening   1. Place your first two fingers of either hand on your forehead. 2. Start to bend your head forward while using gentle pressure from your fingers to keep your head from bending. 3. Hold for about 6 seconds. 4. Repeat 8 to 12 times. Neutral position strengthening   1. Using one hand, place your fingertips on the back of your head at the top of your neck.   2. Start to bend your head backward while using gentle pressure from your fingers to keep your head from bending. 3. Hold for about 6 seconds. 4. Repeat 8 to 12 times. Chin tuck   1. Lie on the floor with a rolled-up towel under your neck. Your head should be touching the floor. 2. Slowly bring your chin toward your chest.  3. Hold for a count of 6, and then relax for up to 10 seconds. 4. Repeat 8 to 12 times. Follow-up care is a key part of your treatment and safety. Be sure to make and go to all appointments, and call your doctor if you are having problems. It's also a good idea to know your test results and keep a list of the medicines you take. Where can you learn more? Go to [email or web address]. org and sign in to your [employer] account. Enter M679 in the [employer] box to learn more about \"Neck Strain or Sprain: Rehab Exercises. \"     If you do not have an account, please click on the \"Sign Up Now\" link. Current as of: March 2, 2020               Content Version: 12.5  © 0893-4353 [employer]wise, Incorporated. Care instructions adapted under license by [facility] ([facility]). If you have questions about a medical condition or this instruction, always ask your healthcare professional. [address] any warranty or liability for your use of this information.

## 2020-08-04 NOTE — PROGRESS NOTES
[address] AND [facility]TS MEDICINE  [address] [place] [address]  Dept: [phone]  Dept Fax: [phone]          Right shoulder Visit - Follow up    Subjective:     Chief Complaint   Patient presents with    Shoulder Pain     [facility] right shoulder pain- injection only     CLAIM: [id number]  DOI: 08/11/1993  Dx: T13.381L         M75.01         [phone]         861. 2         840.9  HPI:     Nelson BaptistejanetteÁngel presents today for Right shoulder pain. Patient is here today for cortisone injections into Right shoulder. Her last cortisone injection was on 7/10/2018. I have reviewed the CC, and if not present in this note, I have reviewed in the patient's chart. I agree with the documentation provided by other staff and have reviewed their documentation prior to providing my signature indicating agreement. Vitals:   Temp 97 °F (36.1 °C)   Ht 5' (1.524 m)   Wt 179 lb (81.2 kg)   LMP 11/27/2008 (Within Days)   BMI 34.96 kg/m²  Body mass index is 34.96 kg/m². Assessment:     1. Traumatic arthropathy, shoulder region      Procedures:    Procedure: Yes      Glenohumeral Joint Injection    Location: right Shoulder  Procedure: Corticosteroid injection into the right shoulder. The patient was placed in the  Side Lying position on the exam table. The posterior soft spot approximately 2 cm distal and 2 cm medial to the posterior acromial edge was identified and marked. The skin was prepped with betadine in a sterile fashion. Utilizing an ultrasound for precise placement and clean technique with sterile gloves, a 5 cc solution containing 4 cc of 1 % Lidocaine with 1 cc containing 40mg of Depo medrol was injected into the glenohumeral joint. There was no resistance to injection. The wound was cleansed and a Band-Aid was placed. The patient tolerated the procedure without difficulty.  Adverse reactions of the injection was discussed with the patient including signs of infection (increasing pain, redness, swelling) and the patient was instructed to call immediately with any of these symptoms. Plan:   Patient should return to the clinic in 3 months or PRN  to follow up with  Deisi Alan PA-C. The patient will call the office immediately with any problems. Orders Placed This Encounter   Medications    lidocaine 1 % injection 4 mL    methylPREDNISolone acetate (DEPO-MEDROL) injection 40 mg       No orders of the defined types were placed in this encounter. Malaika Eng PA-C, have personally seen this patient, reviewed the chart including history, and imaging if done. I personally  performed the physical exam and obtained any needed additional history. I placed orders, performed or supervised procedures and developed the treatment plan.     Electronically signed by Zac Bolivar PA-C, on 8/4/2020 at 4:39 PM      Electronically signed by Zac Bolivar PA-C, on 8/4/2020 at 4:39 PM

## 2021-03-10 ENCOUNTER — OFFICE VISIT (OUTPATIENT)
Dept: ORTHOPEDIC SURGERY | Age: 66
End: 2021-03-10
Payer: COMMERCIAL

## 2021-03-10 VITALS
HEIGHT: 60 IN | HEART RATE: 75 BPM | BODY MASS INDEX: 35.14 KG/M2 | WEIGHT: 179 LBS | TEMPERATURE: 97.4 F | DIASTOLIC BLOOD PRESSURE: 74 MMHG | SYSTOLIC BLOOD PRESSURE: 130 MMHG

## 2021-03-10 DIAGNOSIS — M75.01 SECONDARY ADHESIVE CAPSULITIS OF SHOULDER, RIGHT: Primary | ICD-10-CM

## 2021-03-10 DIAGNOSIS — M12.519 TRAUMATIC ARTHROPATHY, SHOULDER REGION: Primary | ICD-10-CM

## 2021-03-10 PROCEDURE — 99213 OFFICE O/P EST LOW 20 MIN: CPT | Performed by: PHYSICIAN ASSISTANT

## 2021-03-10 ASSESSMENT — ENCOUNTER SYMPTOMS
CONSTIPATION: 0
SHORTNESS OF BREATH: 0
NAUSEA: 0
COLOR CHANGE: 0
ABDOMINAL DISTENTION: 0
APNEA: 0
COUGH: 0
ABDOMINAL PAIN: 0
CHEST TIGHTNESS: 0
DIARRHEA: 0
VOMITING: 0

## 2021-03-10 NOTE — PROGRESS NOTES
[address] AND SPORTS MEDICINE  [address] [place] [address]  Dept: [phone]  Dept Fax: [phone]        Right Shoulder - Follow Up     Chief Complaint:     Chief Complaint   Patient presents with    Shoulder Pain     [facility], Right shoulder pain   CLAIM: [id number]  DOI: 08/11/1994  Dx: L79.861Y         B85.55         256.32-M00.144         111. 2         840.9  HPI:     Michelle Swanson is a 60y.o. year old right hand dominant female that has had pain in the right shoulder for 25.5 years. As far as any trauma to the shoulder, the patient indicates that she sustained a work related injury to her shoulder on 08/11/1994. Since the injury, the pain is worse at night and when doing overhead activities. Weakness of the shoulder has been noted. The pain restricts activities such as reaching above her head, lifting above the head, getting dressed, working without pain and reaching behind her back. The pain does not seem to improve with time. The following medications have been tried: Meloxicam and Motrin. The patient has had a corticosteroid injection into the glenohumeral joint space on 08/04/2020 and she had great pain relief. The patient has tried physical therapy with mild pain relief. The patient has had surgery in 2010 and it was a lysis of adhesions. The opposite shoulder is okay. Neck pain has not been present. Patient states that she would like for us to inject her shoulder again and she is here to ask [facility] for another cortisone injection so she may have good pain relief. Review of Systems   Constitutional: Positive for activity change. Negative for appetite change, fatigue and fever. Respiratory: Negative for apnea, cough, chest tightness and shortness of breath. Cardiovascular: Negative for chest pain, palpitations and leg swelling.    Gastrointestinal: Negative for abdominal distention, abdominal pain, constipation, diarrhea, nausea and vomiting. Genitourinary: Negative for difficulty urinating, dysuria and hematuria. Musculoskeletal: Positive for arthralgias. Negative for gait problem, joint swelling and myalgias. Skin: Negative for color change and rash. Neurological: Negative for dizziness, weakness, numbness and headaches. Psychiatric/Behavioral: Negative for sleep disturbance.      Past Medical History:    Past Medical History:   Diagnosis Date    HTN (hypertension)     ON RX    Hyperlipidemia     ON RX     IBS (irritable bowel syndrome)     Ovarian cyst     reabsorbed    Rotator cuff syndrome of right shoulder     ON GOING HAS HAD SURGERY AND INJECTIONS    Wears glasses      Past Surgical History:    Past Surgical History:   Procedure Laterality Date     SECTION      CHOLECYSTECTOMY, LAPAROSCOPIC      COLONOSCOPY      negative    FINGER TRIGGER RELEASE Right 2017    middle    MYRINGOTOMY AND TYMPANOSTOMY TUBE PLACEMENT Right 02/10/2017    MYRINGOTOMY AND TYMPANOSTOMY TUBE PLACEMENT Right 2018    MYRINGOTOMY AND TYMPANOSTOMY TUBE PLACEMENT Right 2018    MYRINGOTOMY WITH T- TUBE INSERTION, NASAL ENDOSCOPY WITH DEBRIDEMENT performed by Joby Hayes MD at [facility] Right 2018    OTHER SURGICAL HISTORY  02/10/2017    FESS    [place] INCISE FINGER TENDON SHEATH Right 2017    RIGHT MIDDLE FINGER TRIGGER RELEASE (3080 TABLE) performed by Luana Griffiths DO at [address] [facility] [address] SCOPE,BX/RMV POLYP/DEBRID N/A 2/10/2017    ENDOSCOPIC SINUS SURGERY WITH STEALTH NAVIGATION, MAXILLARY ANTROSTOMY, ETHMOIDECTOMY, FRONTAL SINUS EXPLORATION, SPHENOIDOTOMY, BILATERAL TURBINATE REDUCTION, RIGHT EAR TUBE INSERTION performed by Joby Hayes MD at [facility] Right     1 SURGERY AND SEVERAL INJECTIONS    TONSILLECTOMY  1966    WISDOM TOOTH EXTRACTION  1988    4 TEETH REMOVED     Current Medications: Current Outpatient Medications   Medication Sig Dispense Refill    calcium carbonate 600 MG TABS tablet Take 1 tablet by mouth daily      VITAMIN D PO Take 1 tablet by mouth daily      cyclobenzaprine (FLEXERIL) 10 MG tablet Take 10 mg by mouth as needed.  hydrochlorothiazide (HYDRODIURIL) 25 MG tablet Take 25 mg by mouth daily.  ibuprofen (ADVIL;MOTRIN) 800 MG tablet Take 800 mg by mouth every 6 hours as needed LAST DOSE 12/21/2018      loratadine (CLARITIN) 10 MG tablet Take 10 mg by mouth as needed Last dose 12/26/2018       No current facility-administered medications for this visit.       Facility-Administered Medications Ordered in Other Visits   Medication Dose Route Frequency Provider Last Rate Last Admin    Bupivacaine HCl (PF) (MARCAINE) 0.75 % injection 225 mg  30 mL Intradermal Once Earlie Sine, DO         Allergies:    Seasonal    Social History:   Social History     Socioeconomic History    Marital status:      Spouse name: None    Number of children: None    Years of education: None    Highest education level: None   Occupational History    None   Social Needs    Financial resource strain: None    Food insecurity     Worry: None     Inability: None    Transportation needs     Medical: None     Non-medical: None   Tobacco Use    Smoking status: Never Smoker    Smokeless tobacco: Never Used   Substance and Sexual Activity    Alcohol use: Yes     Frequency: Monthly or less     Drinks per session: 1 or 2     Comment: BEER WINE OR MIXED DRINKS 1 OR 2 A WEEK    Drug use: No    Sexual activity: Yes     Partners: Male   Lifestyle    Physical activity     Days per week: None     Minutes per session: None    Stress: None   Relationships    Social connections     Talks on phone: None     Gets together: None     Attends Shinto service: None     Active member of club or organization: None     Attends meetings of clubs or organizations: None     Relationship status: None  Intimate partner violence     Fear of current or ex partner: None     Emotionally abused: None     Physically abused: None     Forced sexual activity: None   Other Topics Concern    None   Social History Narrative    None     Family History:  Family History   Problem Relation Age of Onset    High Blood Pressure Mother     COPD Mother     Cancer Maternal Cousin 46        Colon CA    Cancer Maternal Grandmother         ovarian     High Blood Pressure Maternal Grandmother     Other Brother         CEREBAL PALSY   OF NATURAL CAUSES     I have reviewed the CC, HPI, ROS, PMH, FHX, Social History, and if not present in this note, I have reviewed in the patient's chart. I agree with the documentation provided by other staff and have reviewed their documentation prior to providing my signature indicating agreement. Vitals:   /74   Pulse 75   Temp 97.4 °F (36.3 °C)   Ht 5' (1.524 m)   Wt 179 lb (81.2 kg)   LMP 2008 (Within Days)   BMI 34.96 kg/m²  Body mass index is 34.96 kg/m². Physical Examination:     Orthopedics:    GENERAL: Alert and oriented X3 in no acute distress. SKIN: Intact without lesions or ulcerations. NEURO: Musculoskeletal and axillary nerves intact to sensory and motor testing. VASC: Capillary refill is less than 3 seconds. Right Shoulder Exam    GEN: Alert and oriented X 3, in no acute distress. SKIN: Intact without rashes, lesions, or ulcerations. NEURO: Musculoskeletal ans axillary nerves intact to sensory and motor testing. VASC: Cap refill less than than 3 secs. Negative Adson's test, Negative Leyda's test.  ROM: 120 degrees of forward elevation, 30 degrees of external rotation in neutral, 80 degrees of external rotation in abduction, internal rotation to back pocket. STRENGTH: Supraspinatus 4/5, external rotators 5/5.     MUSC: No atrophy, negative subscap lift off, (-) belly press test, (+) bear hug test.   IMP: (+) Neer's sign, (+) Hawkin's sign, no painful arc, no pain with cross body abduction. PALP: no pain over anterolateral acromion, pain over AC joint, no pain over traps/rhomboids. INST: (-) Berino's test, (+) Speed's test  Assessment:     1. Traumatic arthropathy of right shoulder      Procedures:    Procedure: no  Radiology:   Xr Shoulder Right (min 2 Views)    Result Date: 3/10/2021  SHOULDER X-RAY Two views of the right shoulder and 2 views of the scapula, including AP, scapular Y, outlet and axillary views reveal: The glenohumeral joint is well reduced mild arthritic changes. Proximal migration of the humeral head has not occurred. Acromion is a type I. The acromioclavicular joint shows mild degenerative changes. Postop surgical changes of a distal clavicle excision and a subacromial decompression noted. Impression: Mild degenerative changes and stable postop surgical changes of the right shoulder     Plan:   Treatment : I reviewed the X-ray with the patient and I informed her that the shoulder has arthritis that is causing her to have the pain that she has in her shoulder. We discussed the etiologies and natural histories of traumatic arthropathy of right shoulder. We discussed the various treatment alternatives including anti-inflammatory medications, physical therapy, injections, further imaging studies and as a last result surgery. During today's visit, I explained to the patient that she has arthritis in her shoulder and the only thing that will work for her pain is a total shoulder replacement. I then asked the patient if she is ready to have her shoulder replaced and she stated that she is not ready to have her shoulder replaced yet. But she does plan on retiring this summer. I then asked her if she would like to have us ask for a cortisone injection through [facility] and she stated that she would like that because she is not ready to have her shoulder replaced yet.  I then informed her that we will ask [facility] for the injection and then we will have her come back so we can inject the shoulder. The patient then stated that she understands the plan and at this time, the patient has opted for a C9 for a cortisone injection. Patient should return to the clinic once we get approval for the cortisone injection and then she is to follow up with Christiano Springer PA-C for a cortisone injection. The patient will call the office immediately with any problems. No orders of the defined types were placed in this encounter. No orders of the defined types were placed in this encounter. Davion LOPEZ am scribing for and in the presence of Christiano Springer PA-C. 3/10/2021  4:29 PM    I, Christiano Springer PA-C, have personally seen this patient, reviewed the chart including history, and imaging if done. I personally  performed the physical exam and obtained any needed additional history. I placed orders, performed or supervised procedures and developed the treatment plan.     Electronically signed by Hollie Manuel PA-C, on 3/10/2021 at 5:40 PM      Electronically signed by Tessa Ellis, on 3/10/2021 at 4:29 PM

## 2021-03-30 ENCOUNTER — HOSPITAL ENCOUNTER (OUTPATIENT)
Age: 66
Setting detail: SPECIMEN
Discharge: HOME | End: 2021-03-30
Payer: COMMERCIAL

## 2021-03-30 ENCOUNTER — OFFICE VISIT (OUTPATIENT)
Dept: OBGYN CLINIC | Age: 66
End: 2021-03-30
Payer: COMMERCIAL

## 2021-03-30 VITALS
BODY MASS INDEX: 33.18 KG/M2 | DIASTOLIC BLOOD PRESSURE: 84 MMHG | WEIGHT: 169 LBS | SYSTOLIC BLOOD PRESSURE: 124 MMHG | HEIGHT: 60 IN

## 2021-03-30 DIAGNOSIS — Z01.419 ENCOUNTER FOR GYNECOLOGICAL EXAMINATION: Primary | ICD-10-CM

## 2021-03-30 DIAGNOSIS — Z12.31 ENCOUNTER FOR SCREENING MAMMOGRAM FOR BREAST CANCER: ICD-10-CM

## 2021-03-30 PROCEDURE — 99396 PREV VISIT EST AGE 40-64: CPT | Performed by: OBSTETRICS & GYNECOLOGY

## 2021-03-30 ASSESSMENT — ENCOUNTER SYMPTOMS
ABDOMINAL PAIN: 0
NAUSEA: 0
DIARRHEA: 0
CONSTIPATION: 0
COUGH: 0
VOMITING: 0
WHEEZING: 0

## 2021-04-05 LAB — CYTOLOGY REPORT: NORMAL

## 2021-05-03 ENCOUNTER — HOSPITAL ENCOUNTER (OUTPATIENT)
Dept: MAMMOGRAPHY | Age: 66
Discharge: HOME | End: 2021-05-05
Payer: COMMERCIAL

## 2021-05-03 DIAGNOSIS — Z12.31 VISIT FOR SCREENING MAMMOGRAM: ICD-10-CM

## 2021-05-03 PROCEDURE — 77063 BREAST TOMOSYNTHESIS BI: CPT

## 2021-06-29 ENCOUNTER — PROCEDURE VISIT (OUTPATIENT)
Dept: ORTHOPEDIC SURGERY | Age: 66
End: 2021-06-29
Payer: COMMERCIAL

## 2021-06-29 VITALS
HEIGHT: 60 IN | RESPIRATION RATE: 12 BRPM | SYSTOLIC BLOOD PRESSURE: 121 MMHG | DIASTOLIC BLOOD PRESSURE: 87 MMHG | WEIGHT: 169 LBS | HEART RATE: 80 BPM | BODY MASS INDEX: 33.18 KG/M2

## 2021-06-29 DIAGNOSIS — M12.519 TRAUMATIC ARTHROPATHY, SHOULDER REGION: Primary | ICD-10-CM

## 2021-06-29 PROCEDURE — 20611 DRAIN/INJ JOINT/BURSA W/US: CPT | Performed by: PHYSICIAN ASSISTANT

## 2021-06-29 RX ORDER — METHYLPREDNISOLONE ACETATE 40 MG/ML
40 INJECTION, SUSPENSION INTRA-ARTICULAR; INTRALESIONAL; INTRAMUSCULAR; SOFT TISSUE ONCE
Status: COMPLETED | OUTPATIENT
Start: 2021-06-29 | End: 2021-06-29

## 2021-06-29 RX ORDER — LIDOCAINE HYDROCHLORIDE 10 MG/ML
4 INJECTION, SOLUTION INFILTRATION; PERINEURAL ONCE
Status: COMPLETED | OUTPATIENT
Start: 2021-06-29 | End: 2021-06-29

## 2021-06-29 RX ADMIN — METHYLPREDNISOLONE ACETATE 40 MG: 40 INJECTION, SUSPENSION INTRA-ARTICULAR; INTRALESIONAL; INTRAMUSCULAR; SOFT TISSUE at 15:42

## 2021-06-29 RX ADMIN — LIDOCAINE HYDROCHLORIDE 4 ML: 10 INJECTION, SOLUTION INFILTRATION; PERINEURAL at 15:42

## 2021-06-29 NOTE — PROGRESS NOTES
[facility] [address] AND SPORTS MEDICINE  [address] [place] [id number]  Dept: [phone]  Dept Fax: [phone]          Right shoulder Visit - Follow up    Subjective:     Chief Complaint   Patient presents with    Shoulder Pain     [address] [facility] Right Shoulder Injeciton       CLAIM: [id number]  DOI: 08/11/1994  Dx: C21.730J         J08.29         178.74-G57.505         538. 2         840.9  HPI:     Sherine Marsh presents today for Right shoulder pain. Patient is here today for cortisone injections into Right shoulder glenohumeral space. She had her last cortisone injection was on 8/4/2020 with excellent relief. I have reviewed the CC, and if not present in this note, I have reviewed in the patient's chart. I agree with the documentation provided by other staff and have reviewed their documentation prior to providing my signature indicating agreement. Vitals:   /87   Pulse 80   Resp 12   Ht 5' (1.524 m)   Wt 169 lb (76.7 kg)   LMP 11/27/2008 (Within Days)   BMI 33.01 kg/m²  Body mass index is 33.01 kg/m². Assessment:     1. Traumatic arthropathy, shoulder region          Procedure:   Procedure: Yes      Glenohumeral Joint Injection    Location: right Shoulder  Procedure: I discussed in detail the risks, benefits and complications of an injection which included but are not limited to infection, skin reactions, hot swollen joint and anaphylaxis with the patient. The patient verbalized understanding and gave informed consent for the injection. The patient was then placed in the  Side Lying position on the exam table. The posterior soft spot approximately 2 cm distal and 2 cm medial to the posterior acromial edge was identified and marked. The skin was prepped with betadine in a sterile fashion.  Utilizing [email or web address] ultrasound unit with a variable frequency linear transducer for precise placement and clean technique with sterile gloves, a 5 cc solution containing 4 cc of 1 % Lidocaine with 1 cc containing 40mg of Depo medrol was injected into the glenohumeral joint. There was no resistance to injection with a 22-gauge spinal needle. The needled was withdrawn and the injection site was cleansed. A Band-Aid was placed over the injection site. There was no resistance to the injection and the patient tolerated the procedure well without difficulty. Adverse reactions of the injection was discussed with the patient including signs of infection, increasing pain, redness, swelling, warmth, fever, chills and the patient was instructed to call immediately with any of these symptoms. Successful needle placement was achieved and final images were taken and saved in the patient's chart for the permanent record. The images are stored on SD card in the machine until downloaded to the patient's chart. Plan:   Patient should return to the clinic in 3 months or prn to follow up with  Delaney Welch PA-C. The patient will call the office immediately with any problems. Orders Placed This Encounter   Medications    lidocaine 1 % injection 4 mL    methylPREDNISolone acetate (DEPO-MEDROL) injection 40 mg       No orders of the defined types were placed in this encounter. Ale Hernandez PA-C, have personally seen this patient, reviewed the chart including history, and imaging if done. I personally  performed the physical exam and obtained any needed additional history. I placed orders, performed or supervised procedures and developed the treatment plan.     Electronically signed by Ashley Page PA-C, on 6/29/2021 at 3:22 PM      Electronically signed by Ashley Page PA-C, on 6/29/2021 at 3:22 PM

## 2021-12-02 ENCOUNTER — HOSPITAL ENCOUNTER (OUTPATIENT)
Dept: ULTRASOUND IMAGING | Age: 66
Discharge: HOME | End: 2021-12-04
Payer: COMMERCIAL

## 2021-12-02 ENCOUNTER — HOSPITAL ENCOUNTER (OUTPATIENT)
Dept: MAMMOGRAPHY | Age: 66
Discharge: HOME | End: 2021-12-04
Payer: COMMERCIAL

## 2021-12-02 DIAGNOSIS — N64.9 DISORDER OF BREAST, UNSPECIFIED: ICD-10-CM

## 2021-12-02 PROCEDURE — G0279 TOMOSYNTHESIS, MAMMO: HCPCS

## 2021-12-02 PROCEDURE — 76642 ULTRASOUND BREAST LIMITED: CPT

## 2022-04-09 ASSESSMENT — ENCOUNTER SYMPTOMS
COUGH: 0
DIARRHEA: 0
ABDOMINAL DISTENTION: 0
CONSTIPATION: 0
SHORTNESS OF BREATH: 0
ABDOMINAL PAIN: 0
BACK PAIN: 0

## 2022-04-09 NOTE — PROGRESS NOTES
[address] [facility] - [address] [facility]  Dept: [phone]  OF VISIT:  22        History and Physical    Moiz Flores    :  1955  CHIEF COMPLAINT:  No chief complaint on file. HPI :   Stephanie Powell is a 73 y.o. 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 Has 3 children- 46/45/35. Working on healthy eating and trying to increase activity. The patient was seen and examined. Per the patient bowels are regular. She has no voiding complaints. She denies any bloating as well as vaginal discharge. Denies vaginal dryness. Denies hot flushes.  Denies VB.   _____________________________________________________________________  Past Medical History:   Diagnosis Date    HTN (hypertension)     ON RX    Hyperlipidemia     ON RX     IBS (irritable bowel syndrome)     Ovarian cyst     reabsorbed    Rotator cuff syndrome of right shoulder     ON GOING HAS HAD SURGERY AND INJECTIONS    Wears glasses                                                                    Past Surgical History:   Procedure Laterality Date     SECTION      CHOLECYSTECTOMY, LAPAROSCOPIC      COLONOSCOPY      negative    FINGER TRIGGER RELEASE Right 2017    middle    MYRINGOTOMY AND TYMPANOSTOMY TUBE PLACEMENT Right 02/10/2017    MYRINGOTOMY AND TYMPANOSTOMY TUBE PLACEMENT Right 2018    MYRINGOTOMY AND TYMPANOSTOMY TUBE PLACEMENT Right 2018    MYRINGOTOMY WITH T- TUBE INSERTION, NASAL ENDOSCOPY WITH DEBRIDEMENT performed by Honorio Hirsch MD at [address] [place] [address] Right 2018    OTHER SURGICAL HISTORY  02/10/2017    FESS    [place] INCISE FINGER TENDON SHEATH Right 2017    RIGHT MIDDLE FINGER TRIGGER RELEASE (3080 TABLE) performed by Brittney Hargrove DO at [address] [facility] SCOPE,BX/RMV POLYP/DEBRID N/A 2/10/2017    ENDOSCOPIC SINUS SURGERY WITH STEALTH NAVIGATION, MAXILLARY ANTROSTOMY, ETHMOIDECTOMY, FRONTAL SINUS EXPLORATION, SPHENOIDOTOMY, BILATERAL TURBINATE REDUCTION, RIGHT EAR TUBE INSERTION performed by Tate Martino MD at [address] Right 2012    1 SURGERY AND SEVERAL INJECTIONS    TONSILLECTOMY  1966    WISDOM TOOTH EXTRACTION  1988    4 TEETH REMOVED     Family History   Problem Relation Age of Onset    High Blood Pressure Mother     COPD Mother     Cancer Maternal Cousin 46        Colon CA    Cancer Maternal Grandmother         ovarian     High Blood Pressure Maternal Grandmother     Other Brother         CEREBAL PALSY   OF NATURAL CAUSES     Social History     Tobacco Use   Smoking Status Never Smoker   Smokeless Tobacco Never Used     Social History     Substance and Sexual Activity   Alcohol Use Yes    Comment: BEER WINE OR MIXED DRINKS 1 OR 2 A WEEK     Current Outpatient Medications   Medication Sig Dispense Refill    calcium carbonate 600 MG TABS tablet Take 1 tablet by mouth daily      VITAMIN D PO Take 1 tablet by mouth daily      cyclobenzaprine (FLEXERIL) 10 MG tablet Take 10 mg by mouth as needed.  hydrochlorothiazide (HYDRODIURIL) 25 MG tablet Take 25 mg by mouth daily.  ibuprofen (ADVIL;MOTRIN) 800 MG tablet Take 800 mg by mouth every 6 hours as needed LAST DOSE 2018      loratadine (CLARITIN) 10 MG tablet Take 10 mg by mouth as needed Last dose 2018       No current facility-administered medications for this visit. Facility-Administered Medications Ordered in Other Visits   Medication Dose Route Frequency Provider Last Rate Last Admin    Bupivacaine HCl (PF) (MARCAINE) 0.75 % injection 225 mg  30 mL IntraDERmal Once Luis Felipe Graves DO           Allergies:  Seasonal    Gynecologic History:  Patient's last menstrual period was 2008 (within days).   Sexually Active: Yes  How many partners in the last 12 months- 1  Dyspareunia: denies  STD History: No  Pap smear history: negative  Hx HRT denies  Dexascan: recently done at [facility]: 21 BIRAD 1  Colonoscopy:   Family hx Breast, Ovarian or Colorectal Cancer: MGM- some kind of ovarian    OB History    Para Term  AB Living   6 6 3 0 0 3   SAB IAB Ectopic Molar Multiple Live Births   0 0 0 0 0 3     ______________________________________________________________________  REVIEW OF SYSTEMS:  Review of Systems   Constitutional: Negative for appetite change and fatigue. HENT: Negative for congestion and hearing loss. Eyes: Negative for visual disturbance. Respiratory: Negative for cough and shortness of breath. Cardiovascular: Negative for chest pain and palpitations. Gastrointestinal: Negative for abdominal distention, abdominal pain, constipation and diarrhea. Genitourinary: Negative for flank pain, frequency, menstrual problem, pelvic pain and vaginal discharge. Musculoskeletal: Negative for back pain. Neurological: Negative for syncope and headaches. Psychiatric/Behavioral: Negative for behavioral problems. LMP 2008 (Within Days)                    Physical Exam:     Physical Exam  Constitutional:       Appearance: She is well-developed. HENT:      Head: Normocephalic. Eyes:      Extraocular Movements: Extraocular movements intact. Conjunctiva/sclera: Conjunctivae normal.   Neck:      Thyroid: No thyromegaly. Trachea: No tracheal deviation. Pulmonary:      Effort: Pulmonary effort is normal. No respiratory distress. Chest:   Breasts: Breasts are symmetrical.      Right: No inverted nipple. Left: No inverted nipple, mass, nipple discharge, skin change or tenderness. Abdominal:      General: There is no distension. Palpations: Abdomen is soft. There is no mass. Tenderness: There is no abdominal tenderness.    Genitourinary:     Labia: Right: No rash or lesion. Left: No rash or lesion. Vagina: No vaginal discharge or tenderness. Cervix: No cervical motion tenderness, discharge or friability. Uterus: Not deviated, not enlarged and not fixed. Adnexa:         Right: No mass, tenderness or fullness. Left: No mass, tenderness or fullness. Musculoskeletal:         General: No tenderness. Normal range of motion. Cervical back: Normal range of motion. Skin:     General: Skin is warm and dry. Neurological:      General: No focal deficit present. Mental Status: She is alert and oriented to person, place, and time. Mental status is at baseline. Psychiatric:         Mood and Affect: Mood normal.         Behavior: Behavior normal.         Thought Content: Thought content normal.         Judgment: Judgment normal.             ASSESSMENT:        73 y.o. Female; Annual   Diagnosis Orders   1. Encounter for gynecological examination     2. Encounter for screening mammogram for breast cancer     3. Postmenopausal       No follow-ups on file. Hereditary Breast, Ovarian,Colon and Uterine Cancer screening Done. Tobacco & Secondary smoke risks reviewed; instructed oncessation and avoidance    PLAN:  - Pap not collected per ASCCP guidelines. -Menopause symptoms discussed   - Incontinence  - Vaginal Dryness  - Hormone Replacement  - Screening mammogram discussed and advised yearly if normal starting at age 36.  - Calcium and Vitamin D dosing reviewed. - Colonoscopy screening reviewed. -General diet and exercise reviewed. - Routine health maintenance per patients PCP.     Electronically signed by JOHN Bates CNP on 4/9/2022at 3:04 PM  [address]

## 2022-04-11 ENCOUNTER — OFFICE VISIT (OUTPATIENT)
Dept: OBGYN CLINIC | Age: 67
End: 2022-04-11
Payer: OTHER GOVERNMENT

## 2022-04-11 VITALS
WEIGHT: 179.2 LBS | BODY MASS INDEX: 35.18 KG/M2 | DIASTOLIC BLOOD PRESSURE: 88 MMHG | SYSTOLIC BLOOD PRESSURE: 128 MMHG | HEIGHT: 60 IN

## 2022-04-11 DIAGNOSIS — Z12.31 ENCOUNTER FOR SCREENING MAMMOGRAM FOR BREAST CANCER: ICD-10-CM

## 2022-04-11 DIAGNOSIS — Z01.419 ENCOUNTER FOR GYNECOLOGICAL EXAMINATION: Primary | ICD-10-CM

## 2022-04-11 DIAGNOSIS — Z78.0 POSTMENOPAUSAL: ICD-10-CM

## 2022-04-11 PROBLEM — H25.811 COMBINED FORMS OF AGE-RELATED CATARACT, RIGHT EYE: Status: ACTIVE | Noted: 2022-04-11

## 2022-04-11 PROBLEM — G89.4 CHRONIC PAIN SYNDROME: Status: ACTIVE | Noted: 2022-04-11

## 2022-04-11 PROBLEM — F33.40 MAJOR DEPRESSIVE DISORDER, RECURRENT, IN REMISSION, UNSPECIFIED (HCC): Status: ACTIVE | Noted: 2022-04-11

## 2022-04-11 PROBLEM — F41.1 GENERALIZED ANXIETY DISORDER: Status: ACTIVE | Noted: 2022-04-11

## 2022-04-11 PROBLEM — H91.93 BILATERAL HEARING LOSS: Status: ACTIVE | Noted: 2022-04-11

## 2022-04-11 PROCEDURE — 99397 PER PM REEVAL EST PAT 65+ YR: CPT | Performed by: NURSE PRACTITIONER

## 2022-04-11 RX ORDER — AMLODIPINE BESYLATE 2.5 MG/1
TABLET ORAL
COMMUNITY
Start: 2021-07-16 | End: 2022-07-17

## 2022-04-11 NOTE — PATIENT INSTRUCTIONS
Patient Education        Learning About Calcium  What is calcium? Calcium keeps your bones and muscles--including your heart--healthy and strong. Your body needs vitamin D to absorb calcium. People who don't get enough calcium and vitamin D throughout life have an increased chance of having thin and brittle bones (osteoporosis) in their later years. Thin and brittle bones break easily. They can lead to serious injuries. This is why it's important for you to get enough calcium and vitamin D as a child and as an adult. It helps keep your bones strong as you get older. And it protects you against possiblebreaks. Your body also uses vitamin D to help your muscles absorb calcium and work well. If your muscles don't get enough calcium, then they can cramp, hurt, orfeel weak. You may have long-term (chronic) muscle aches and pains. How much calcium do you need? How much calcium you need each day changes as you age. Here are the recommendeddietary allowances (RDAs) for calcium:   Ages 1 to 3 years: 700 milligrams   Ages 4 to 8 years: 1,000 milligrams   Ages 5 to 25 years: 1,300 milligrams   Ages 23 to 48 years: 1,000 milligrams   Males 46 to 79 years: 1,000 milligrams   Females 46 to 79 years: 1,200 milligrams   Ages 70 and older: 1,200 milligrams  If you are pregnant or breastfeeding, you need the same amount of calcium asother people your age. How can you get enough calcium? Calcium is in foods such as milk, cheese, and yogurt. Vegetables like broccoli, kale, and Chinese cabbage also have it. You can get calcium if you eat the soft edible bones in canned sardines and canned salmon. Foods with added (fortified) calcium include some cereals, juices, soy drinks, and tofu. The food label willshow how much of it was added. You can figure out how much calcium is in a food by looking at the percent daily value section on the nutrition facts label. The food label assumes the daily value of calcium is 1,300 mg.  So if one serving of a food has a dailyvalue of 20% of calcium, that food has 260 mg of calcium in one serving. Some people who don't get enough calcium may need supplements. Two common calcium supplements are calcium citrate and calcium carbonate. Calcium carbonate is best absorbed when it is taken with food. Calcium citrate can be absorbed well with or without food. Spreading calcium out over the course of the day can reduce stomach upset. And your body absorbs it better when it is spread over the day. Try not to take more than 500 mg of calcium supplement barbara time. Where can you learn more? Go to [email or web address] org and sign in to your [employer] account. Enter S264 in the [employer] box to learn more about \"Learning About Calcium. \"     If you do not have an account, please click on the \"Sign Up Now\" link. Current as of: September 8, 2021               Content Version: 13.2  © 2006-2022 Healthwise[employer] Incorporated. Care instructions adapted under license by [facility] ([facility]). If you have questions about a medical condition or this instruction, always ask your healthcare professional. [place] [address] any warranty or liability for your use of this information.

## 2022-05-24 ENCOUNTER — HOSPITAL ENCOUNTER (OUTPATIENT)
Dept: MAMMOGRAPHY | Age: 67
Discharge: HOME | End: 2022-05-26
Payer: OTHER GOVERNMENT

## 2022-05-24 DIAGNOSIS — Z12.31 ENCOUNTER FOR SCREENING MAMMOGRAM FOR BREAST CANCER: ICD-10-CM

## 2022-05-24 DIAGNOSIS — Z12.31 SCREENING MAMMOGRAM FOR BREAST CANCER: ICD-10-CM

## 2022-05-24 PROCEDURE — 77063 BREAST TOMOSYNTHESIS BI: CPT

## 2023-05-11 ENCOUNTER — HOSPITAL ENCOUNTER (OUTPATIENT)
Dept: GENERAL RADIOLOGY | Age: 68
Discharge: HOME | End: 2023-05-13
Payer: MEDICARE

## 2023-05-11 ENCOUNTER — HOSPITAL ENCOUNTER (OUTPATIENT)
Dept: ULTRASOUND IMAGING | Age: 68
Discharge: HOME | End: 2023-05-13
Payer: MEDICARE

## 2023-05-11 ENCOUNTER — HOSPITAL ENCOUNTER (OUTPATIENT)
Age: 68
Discharge: HOME | End: 2023-05-13
Payer: MEDICARE

## 2023-05-11 DIAGNOSIS — R06.02 SHORTNESS OF BREATH: ICD-10-CM

## 2023-05-11 DIAGNOSIS — R10.13 EPIGASTRIC PAIN: ICD-10-CM

## 2023-05-11 DIAGNOSIS — R06.02 SOB (SHORTNESS OF BREATH): ICD-10-CM

## 2023-05-11 PROCEDURE — 76705 ECHO EXAM OF ABDOMEN: CPT

## 2023-05-11 PROCEDURE — 71046 X-RAY EXAM CHEST 2 VIEWS: CPT

## 2023-05-24 ENCOUNTER — HOSPITAL ENCOUNTER (OUTPATIENT)
Dept: MAMMOGRAPHY | Age: 68
Discharge: HOME | End: 2023-05-26
Payer: OTHER GOVERNMENT

## 2023-05-24 DIAGNOSIS — Z12.31 ENCOUNTER FOR SCREENING MAMMOGRAM FOR MALIGNANT NEOPLASM OF BREAST: ICD-10-CM

## 2023-05-24 PROCEDURE — 77063 BREAST TOMOSYNTHESIS BI: CPT

## 2023-06-05 ENCOUNTER — OFFICE VISIT (OUTPATIENT)
Dept: OBGYN CLINIC | Age: 68
End: 2023-06-05

## 2023-06-05 VITALS
WEIGHT: 173 LBS | DIASTOLIC BLOOD PRESSURE: 94 MMHG | SYSTOLIC BLOOD PRESSURE: 166 MMHG | BODY MASS INDEX: 32.66 KG/M2 | HEART RATE: 88 BPM | HEIGHT: 61 IN

## 2023-06-05 DIAGNOSIS — Z80.41 FAMILY HX OF OVARIAN MALIGNANCY: ICD-10-CM

## 2023-06-05 DIAGNOSIS — Z12.31 ENCOUNTER FOR SCREENING MAMMOGRAM FOR BREAST CANCER: ICD-10-CM

## 2023-06-05 DIAGNOSIS — R14.0 ABDOMINAL BLOATING: ICD-10-CM

## 2023-06-05 DIAGNOSIS — Z01.419 ENCOUNTER FOR WELL WOMAN EXAM: Primary | ICD-10-CM

## 2023-06-05 ASSESSMENT — ENCOUNTER SYMPTOMS
SHORTNESS OF BREATH: 0
CONSTIPATION: 1
COUGH: 0
ALLERGIC/IMMUNOLOGIC NEGATIVE: 1
BACK PAIN: 0
RESPIRATORY NEGATIVE: 1
ABDOMINAL DISTENTION: 1

## 2023-06-05 NOTE — PROGRESS NOTES
[address] [facility]  [facility] - [address] [facility]  Dept: [phone]    6/5/23    [address] [facility] Annual Exam          CHIEF COMPLAINT:    Chief Complaint   Patient presents with    Annual Exam     p 3-30-21 neg m STA 5-25-23 birads1                  BP (!) 166/94 (Site: Right Upper Arm, Position: Sitting, Cuff Size: Large Adult)   Pulse 88   Ht 5' 1\" (1.549 m)   Wt 173 lb (78.5 kg)   LMP 11/27/2008 (Approximate)   BMI 32.69 kg/m²       HPI :   Jaison Hassan 1955 is a 78 y.o. [id number] is here for an annual exam. For the past couple of months she has been experiencing upper abdominal fullness and bloating. She had an ultrasound of the upper abdomen which was normal.    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   One of her kids moved back in with them recently    _____________________________________________________________________  Past Medical History:   Diagnosis Date    HTN (hypertension) 2010    ON RX    Hyperlipidemia 2014    ON RX     IBS (irritable bowel syndrome) 2008    Ovarian cyst 2008    reabsorbed    Rotator cuff syndrome of right shoulder 2012    ON GOING HAS HAD SURGERY AND INJECTIONS    Wears glasses                                                                    Past Surgical History:   Procedure Laterality Date    [facility], LAPAROSCOPIC  1993    COLONOSCOPY  2002    negative    FINGER TRIGGER RELEASE Right 11/28/2017    middle    MYRINGOTOMY AND TYMPANOSTOMY TUBE PLACEMENT Right 02/10/2017    MYRINGOTOMY AND TYMPANOSTOMY TUBE PLACEMENT Right 12/28/2018    MYRINGOTOMY AND TYMPANOSTOMY TUBE PLACEMENT Right 12/28/2018    MYRINGOTOMY WITH T- TUBE INSERTION, NASAL ENDOSCOPY WITH DEBRIDEMENT performed by Espinoza Henriquez MD at [address] [place] [address] Right 12/28/2018    OTHER SURGICAL HISTORY  02/10/2017    FESS

## 2023-07-14 ENCOUNTER — PROCEDURE VISIT (OUTPATIENT)
Dept: OBGYN CLINIC | Age: 68
End: 2023-07-14

## 2023-07-14 VITALS
DIASTOLIC BLOOD PRESSURE: 88 MMHG | HEIGHT: 61 IN | BODY MASS INDEX: 32.77 KG/M2 | SYSTOLIC BLOOD PRESSURE: 130 MMHG | WEIGHT: 173.6 LBS

## 2023-07-14 DIAGNOSIS — R93.89 ENDOMETRIAL THICKENING ON ULTRASOUND: Primary | ICD-10-CM

## 2023-07-14 DIAGNOSIS — N88.2 CERVICAL STENOSIS (UTERINE CERVIX): ICD-10-CM

## 2023-07-14 NOTE — PROGRESS NOTES
Chaperone for Intimate Exam  Chaperone was offered and accepted as part of the rooming process.   Chaperone: Stevo Marquez CMA

## 2023-07-14 NOTE — PROGRESS NOTES
[address] [facility]  [facility] Tracy Chambers  [address] [address]  Dept: [phone]  Dept Fax: [phone]         2023  [address] [facility]       Chief Complaint  Chief Complaint   Patient presents with    Procedure     EMB for thick endo seen on US 23    . Blood pressure 130/88, height 5' 1\" (1.549 m), weight 173 lb 9.6 oz (78.7 kg), last menstrual period 2008, not currently breastfeeding. HPI:     [address] [facility]  1955 is a 80 y.o. [id number] here today for EMB. She had an ultrasound done due to bloating and a family hx of ovarian cancer. Right ovary was normal and the left ovary was not seen, but the endometrium was thickened at 7mm.   She has not had any PMB      OB History    Para Term  AB Living   6 6 3 0 0 3   SAB IAB Ectopic Molar Multiple Live Births   0 0 0 0 0 3      # Outcome Date GA Lbr Darian/2nd Weight Sex Delivery Anes PTL Lv   6 Para 1987    F CS-Unspec   NINA   5 Para 1977    F Vag-Spont   NINA   4 Para 1973    F Vag-Spont   NINA   3 Term            2 Term            1 Term                Past Medical History:   Diagnosis Date    HTN (hypertension)     ON RX    Hyperlipidemia     ON RX     IBS (irritable bowel syndrome)     Ovarian cyst     reabsorbed    Postpartum depression     Rotator cuff syndrome of right shoulder     ON GOING HAS HAD SURGERY AND INJECTIONS    Trauma     Wears glasses        Past Surgical History:   Procedure Laterality Date    [address]    COLONOSCOPY  2002    negative    FINGER TRIGGER RELEASE Right 2017    middle    MYRINGOTOMY AND TYMPANOSTOMY TUBE PLACEMENT Right 02/10/2017    MYRINGOTOMY AND TYMPANOSTOMY TUBE PLACEMENT Right 2018    MYRINGOTOMY AND TYMPANOSTOMY TUBE PLACEMENT Right 2018    MYRINGOTOMY WITH T- TUBE INSERTION, NASAL ENDOSCOPY WITH

## 2023-09-13 ENCOUNTER — HOSPITAL ENCOUNTER (OUTPATIENT)
Dept: ULTRASOUND IMAGING | Age: 68
Discharge: HOME | End: 2023-09-15
Payer: MEDICARE

## 2023-09-13 DIAGNOSIS — R93.89 ENDOMETRIAL THICKENING ON ULTRASOUND: ICD-10-CM

## 2023-09-13 PROCEDURE — 76856 US EXAM PELVIC COMPLETE: CPT

## 2023-09-27 ENCOUNTER — TRANSCRIBE ORDERS (OUTPATIENT)
Dept: ADMINISTRATIVE | Age: 68
End: 2023-09-27

## 2023-09-27 DIAGNOSIS — R10.84 ABDOMINAL PAIN, GENERALIZED: Primary | ICD-10-CM

## 2023-10-02 ENCOUNTER — HOSPITAL ENCOUNTER (OUTPATIENT)
Dept: CT IMAGING | Facility: CLINIC | Age: 68
Discharge: HOME | End: 2023-10-04
Payer: MEDICARE

## 2023-10-02 DIAGNOSIS — R10.84 ABDOMINAL PAIN, GENERALIZED: ICD-10-CM

## 2023-10-02 LAB
CREAT SERPL-MCNC: 0.8 MG/DL (ref 0.5–0.9)
GFR SERPL CREATININE-BSD FRML MDRD: >60 ML/MIN/1.73M2

## 2023-10-02 PROCEDURE — 36415 COLL VENOUS BLD VENIPUNCTURE: CPT

## 2023-10-02 PROCEDURE — 74177 CT ABD & PELVIS W/CONTRAST: CPT

## 2023-10-02 PROCEDURE — 2580000003 HC RX 258: Performed by: EMERGENCY MEDICINE

## 2023-10-02 PROCEDURE — 82565 ASSAY OF CREATININE: CPT

## 2023-10-02 PROCEDURE — 6360000004 HC RX CONTRAST MEDICATION: Performed by: EMERGENCY MEDICINE

## 2023-10-02 RX ORDER — SODIUM CHLORIDE 0.9 % (FLUSH) 0.9 %
10 SYRINGE (ML) INJECTION PRN
Status: DISCONTINUED | OUTPATIENT
Start: 2023-10-02 | End: 2023-10-05 | Stop reason: HOSPADM

## 2023-10-02 RX ORDER — 0.9 % SODIUM CHLORIDE 0.9 %
70 INTRAVENOUS SOLUTION INTRAVENOUS ONCE
Status: COMPLETED | OUTPATIENT
Start: 2023-10-02 | End: 2023-10-02

## 2023-10-02 RX ADMIN — SODIUM CHLORIDE, PRESERVATIVE FREE 10 ML: 5 INJECTION INTRAVENOUS at 11:38

## 2023-10-02 RX ADMIN — IOPAMIDOL 75 ML: 755 INJECTION, SOLUTION INTRAVENOUS at 11:37

## 2023-10-02 RX ADMIN — SODIUM CHLORIDE 70 ML: 9 INJECTION, SOLUTION INTRAVENOUS at 11:38

## 2023-12-15 ENCOUNTER — HOSPITAL ENCOUNTER (OUTPATIENT)
Age: 68
Setting detail: SPECIMEN
Discharge: HOME | End: 2023-12-15
Payer: MEDICARE

## 2023-12-15 LAB
ALBUMIN SERPL-MCNC: 4 G/DL (ref 3.5–5.2)
ALBUMIN/GLOB SERPL: 1.3 {RATIO} (ref 1–2.5)
ALP SERPL-CCNC: 84 U/L (ref 35–104)
ALT SERPL-CCNC: 13 U/L (ref 5–33)
AST SERPL-CCNC: 19 U/L
BILIRUB DIRECT SERPL-MCNC: <0.1 MG/DL
BILIRUB INDIRECT SERPL-MCNC: NORMAL MG/DL (ref 0–1)
BILIRUB SERPL-MCNC: 0.3 MG/DL (ref 0.3–1.2)
PROT SERPL-MCNC: 7 G/DL (ref 6.4–8.3)

## 2023-12-15 PROCEDURE — 80076 HEPATIC FUNCTION PANEL: CPT

## 2023-12-15 PROCEDURE — 36415 COLL VENOUS BLD VENIPUNCTURE: CPT

## 2024-02-07 ENCOUNTER — TRANSCRIBE ORDERS (OUTPATIENT)
Dept: ADMINISTRATIVE | Age: 69
End: 2024-02-07

## 2024-02-07 DIAGNOSIS — N64.9 DISORDER OF BREAST: Primary | ICD-10-CM

## 2024-02-15 ENCOUNTER — HOSPITAL ENCOUNTER (OUTPATIENT)
Dept: MAMMOGRAPHY | Age: 69
Discharge: HOME | End: 2024-02-17
Payer: MEDICARE

## 2024-02-15 ENCOUNTER — HOSPITAL ENCOUNTER (OUTPATIENT)
Dept: ULTRASOUND IMAGING | Age: 69
Discharge: HOME | End: 2024-02-17
Payer: MEDICARE

## 2024-02-15 VITALS — WEIGHT: 173 LBS | HEIGHT: 61 IN | BODY MASS INDEX: 32.66 KG/M2

## 2024-02-15 DIAGNOSIS — N64.9 DISORDER OF BREAST: ICD-10-CM

## 2024-02-15 PROCEDURE — 76642 ULTRASOUND BREAST LIMITED: CPT

## 2024-02-15 PROCEDURE — G0279 TOMOSYNTHESIS, MAMMO: HCPCS

## 2024-02-27 ENCOUNTER — TRANSCRIBE ORDERS (OUTPATIENT)
Dept: ADMINISTRATIVE | Age: 69
End: 2024-02-27

## 2024-02-27 DIAGNOSIS — R11.0 NAUSEA: Primary | ICD-10-CM

## 2024-03-04 ENCOUNTER — APPOINTMENT (OUTPATIENT)
Dept: GENERAL RADIOLOGY | Age: 69
End: 2024-03-04
Payer: MEDICARE

## 2024-03-04 ENCOUNTER — HOSPITAL ENCOUNTER (EMERGENCY)
Age: 69
Discharge: HOME | End: 2024-03-04
Attending: EMERGENCY MEDICINE
Payer: MEDICARE

## 2024-03-04 VITALS
RESPIRATION RATE: 16 BRPM | BODY MASS INDEX: 31.18 KG/M2 | WEIGHT: 165 LBS | TEMPERATURE: 100.4 F | SYSTOLIC BLOOD PRESSURE: 149 MMHG | OXYGEN SATURATION: 99 % | DIASTOLIC BLOOD PRESSURE: 96 MMHG | HEART RATE: 83 BPM

## 2024-03-04 DIAGNOSIS — J10.1 INFLUENZA B: Primary | ICD-10-CM

## 2024-03-04 LAB
FLUAV RNA RESP QL NAA+PROBE: NOT DETECTED
FLUBV RNA RESP QL NAA+PROBE: DETECTED
SARS-COV-2 RNA RESP QL NAA+PROBE: NOT DETECTED
SOURCE: ABNORMAL
SPECIMEN DESCRIPTION: ABNORMAL
SPECIMEN SOURCE: NORMAL
STREP A, MOLECULAR: NEGATIVE

## 2024-03-04 PROCEDURE — 71045 X-RAY EXAM CHEST 1 VIEW: CPT

## 2024-03-04 PROCEDURE — 87636 SARSCOV2 & INF A&B AMP PRB: CPT

## 2024-03-04 PROCEDURE — 6370000000 HC RX 637 (ALT 250 FOR IP): Performed by: EMERGENCY MEDICINE

## 2024-03-04 PROCEDURE — 2580000003 HC RX 258: Performed by: EMERGENCY MEDICINE

## 2024-03-04 PROCEDURE — 87651 STREP A DNA AMP PROBE: CPT

## 2024-03-04 PROCEDURE — 99284 EMERGENCY DEPT VISIT MOD MDM: CPT

## 2024-03-04 RX ORDER — BENZONATATE 100 MG/1
100 CAPSULE ORAL 3 TIMES DAILY PRN
Qty: 30 CAPSULE | Refills: 0 | Status: SHIPPED | OUTPATIENT
Start: 2024-03-04 | End: 2024-03-14

## 2024-03-04 RX ORDER — IBUPROFEN 800 MG/1
800 TABLET ORAL ONCE
Status: COMPLETED | OUTPATIENT
Start: 2024-03-04 | End: 2024-03-04

## 2024-03-04 RX ORDER — ACETAMINOPHEN 500 MG
1000 TABLET ORAL ONCE
Status: COMPLETED | OUTPATIENT
Start: 2024-03-04 | End: 2024-03-04

## 2024-03-04 RX ORDER — LOSARTAN POTASSIUM 50 MG/1
50 TABLET ORAL NIGHTLY
COMMUNITY

## 2024-03-04 RX ORDER — 0.9 % SODIUM CHLORIDE 0.9 %
1000 INTRAVENOUS SOLUTION INTRAVENOUS ONCE
Status: COMPLETED | OUTPATIENT
Start: 2024-03-04 | End: 2024-03-04

## 2024-03-04 RX ORDER — ONDANSETRON 4 MG/1
4 TABLET, ORALLY DISINTEGRATING ORAL EVERY 8 HOURS PRN
Status: DISCONTINUED | OUTPATIENT
Start: 2024-03-04 | End: 2024-03-04 | Stop reason: HOSPADM

## 2024-03-04 RX ADMIN — SODIUM CHLORIDE 1000 ML: 9 INJECTION, SOLUTION INTRAVENOUS at 13:09

## 2024-03-04 RX ADMIN — IBUPROFEN 800 MG: 800 TABLET ORAL at 13:04

## 2024-03-04 RX ADMIN — ACETAMINOPHEN 1000 MG: 500 TABLET ORAL at 13:04

## 2024-03-04 RX ADMIN — ONDANSETRON 4 MG: 4 TABLET, ORALLY DISINTEGRATING ORAL at 13:05

## 2024-03-04 ASSESSMENT — PAIN SCALES - GENERAL: PAINLEVEL_OUTOF10: 10

## 2024-03-04 ASSESSMENT — PAIN DESCRIPTION - FREQUENCY: FREQUENCY: CONTINUOUS

## 2024-03-04 ASSESSMENT — PAIN DESCRIPTION - LOCATION: LOCATION: GENERALIZED

## 2024-03-04 ASSESSMENT — PAIN DESCRIPTION - DESCRIPTORS: DESCRIPTORS: ACHING

## 2024-03-04 ASSESSMENT — PAIN - FUNCTIONAL ASSESSMENT: PAIN_FUNCTIONAL_ASSESSMENT: 0-10

## 2024-03-04 NOTE — ED NOTES
Pt to ed c/o cough, congestion, sore throat, and dizziness. Pt states she has been around her sick grandkids. Pt a/o x4. Respirations equal and non labored. Call light in reach. Bed locked and in lowest position.

## 2024-03-04 NOTE — ED PROVIDER NOTES
Treatment and Disposition    Patient repeat assessment: Stable.  Labs showing positive for influenza B.  Chest x-ray negative for infiltrate.  Patient appears poorly hydrated, given 1 L normal saline.  Patient feels well, requesting discharge.  Given Rx for Tessalon Perles for cough.    Case discussed with consulting clinician:  N/A    Disposition discussion with patient/family: Patient aware and agrees with disposition plan.    MIPS:  N/A    Social determinants of health impacting treatment or disposition:  None    Shared Decision Making completed with patient regarding risks and benefits of admission versus discharge.  Patient decides to be discharged home.    Code Status Discussion:  Not discussed    \"ED Course\" Notes From Epic Narrator:     All questions answered.  Given strict return precautions.  No further work-up indicated at this time.      CRITICAL CARE:   N/A    PROCEDURES:  Procedures      DATA FOR LAB AND RADIOLOGY TESTS ORDERED BELOW ARE REVIEWED BY THE ED CLINICIAN:    RADIOLOGY: All x-rays, CT, MRI, and formal ultrasound images (except ED bedside ultrasound) are read by the radiologist, see reports below, unless otherwise noted in MDM or here.  Reports below are reviewed by myself.  XR CHEST PORTABLE   Final Result   No acute process.             LABS: Lab orders shown below, the results are reviewed by myself, and all abnormals are listed below.  Labs Reviewed   COVID-19 & INFLUENZA COMBO - Abnormal; Notable for the following components:       Result Value    INFLUENZA B DETECTED (*)     All other components within normal limits   RAPID STREP SCREEN       Vitals Reviewed:    Vitals:    03/04/24 1131   BP: (!) 149/96   Pulse: 83   Resp: 16   Temp: 100.4 °F (38 °C)   TempSrc: Oral   SpO2: 99%   Weight: 74.8 kg (165 lb)     MEDICATIONS GIVEN TO PATIENT THIS ENCOUNTER:  Orders Placed This Encounter   Medications    acetaminophen (TYLENOL) tablet 1,000 mg    ibuprofen (ADVIL;MOTRIN) tablet 800 mg    
(0) independent

## 2024-03-19 ENCOUNTER — HOSPITAL ENCOUNTER (OUTPATIENT)
Dept: NUCLEAR MEDICINE | Age: 69
Discharge: HOME | End: 2024-03-21
Payer: MEDICARE

## 2024-03-19 DIAGNOSIS — R11.0 NAUSEA: ICD-10-CM

## 2024-03-19 PROCEDURE — A9541 TC99M SULFUR COLLOID: HCPCS | Performed by: NURSE PRACTITIONER

## 2024-03-19 PROCEDURE — 78264 GASTRIC EMPTYING IMG STUDY: CPT

## 2024-03-19 PROCEDURE — 3430000000 HC RX DIAGNOSTIC RADIOPHARMACEUTICAL: Performed by: NURSE PRACTITIONER

## 2024-03-19 RX ADMIN — Medication 1.5 MILLICURIE: at 10:52

## 2024-06-19 ENCOUNTER — HOSPITAL ENCOUNTER (OUTPATIENT)
Dept: MAMMOGRAPHY | Age: 69
Discharge: HOME | End: 2024-06-21
Payer: MEDICARE

## 2024-06-19 VITALS — HEIGHT: 61 IN | BODY MASS INDEX: 30.96 KG/M2 | WEIGHT: 164 LBS

## 2024-06-19 DIAGNOSIS — Z12.31 ENCOUNTER FOR SCREENING MAMMOGRAM FOR BREAST CANCER: ICD-10-CM

## 2024-06-19 PROCEDURE — 77063 BREAST TOMOSYNTHESIS BI: CPT

## (undated) DEVICE — COAGULATOR SUCT 10FR L6IN HND FT SWCH VALLEYLAB

## (undated) DEVICE — FIRM 8CM: Brand: NASOPORE

## (undated) DEVICE — SUTURE NONABSORBABLE MONOFILAMENT 3-0 PS-1 18 IN BLK ETHILON 1663H

## (undated) DEVICE — SVMMC HND

## (undated) DEVICE — Z DISCONTINUED BY MEDLINE USE 2711682 TRAY SKIN PREP DRY W/ PREM GLV

## (undated) DEVICE — STERILE COTTON BALLS LARGE 5/P: Brand: MEDLINE

## (undated) DEVICE — ZIMMER® STERILE DISPOSABLE TOURNIQUET CUFF WITH PROTECTIVE SLEEVE AND PLC, DUAL PORT, SINGLE BLADDER, 18 IN. (46 CM)

## (undated) DEVICE — GARMENT COMPR STD FOR 17IN CALF UNIF THER FLOTRN

## (undated) DEVICE — GLOVE ORANGE PI 8   MSG9080

## (undated) DEVICE — TUBING AMB

## (undated) DEVICE — SPLINT 1524050 5PK PAIR DOYLE II AIRWAY: Brand: DOYLE II ™

## (undated) DEVICE — E-Z CLEAN, NON-STICK, PTFE COATED, ELECTROSURGICAL NEEDLE ELECTRODE, MODIFIED EXTENDED INSULATION, 2.75 INCH (7 CM): Brand: MEGADYNE

## (undated) DEVICE — GLOVE ORANGE PI 7 1/2   MSG9075

## (undated) DEVICE — TOWEL,OR,DSP,ST,NATURAL,DLX,4/PK,20PK/CS: Brand: MEDLINE

## (undated) DEVICE — GLOVE ORANGE PI 7   MSG9070

## (undated) DEVICE — GARMENT,MEDLINE,DVT,INT,CALF,MED, GEN2: Brand: MEDLINE

## (undated) DEVICE — PADDING,UNDERCAST,COTTON, 3X4YD STERILE: Brand: MEDLINE

## (undated) DEVICE — OFLOXACIN OTIC SOLUTION 0.3% 5 ML

## (undated) DEVICE — BANDAGE,GAUZE,BULKEE II,4.5"X4.1YD,STRL: Brand: MEDLINE

## (undated) DEVICE — BANDAGE GZ W2XL75IN ST RAYON POLY CNFRM STRTCH LTWT

## (undated) DEVICE — MEDI-VAC NON-CONDUCTIVE SUCTION TUBING 7MM X 6.1M (20 FT.) L: Brand: CARDINAL HEALTH

## (undated) DEVICE — CORD,CAUTERY,BIPOLAR,STERILE: Brand: MEDLINE

## (undated) DEVICE — SPONGE LAP W18XL18IN WHT COT 4 PLY FLD STRUNG RADPQ DISP ST

## (undated) DEVICE — GOWN,AURORA,NONRNF,XL,30/CS: Brand: MEDLINE

## (undated) DEVICE — Z CONVERTED USE 2162213 APPLICATOR PREP 4OZ CHG 4% BACTOSHIELD USE FOR HND WSH AND

## (undated) DEVICE — KIT,ANTI FOG,W/SPONGE & FLUID,SOFT PACK: Brand: MEDLINE